# Patient Record
Sex: FEMALE | Race: WHITE | NOT HISPANIC OR LATINO | Employment: FULL TIME | URBAN - METROPOLITAN AREA
[De-identification: names, ages, dates, MRNs, and addresses within clinical notes are randomized per-mention and may not be internally consistent; named-entity substitution may affect disease eponyms.]

---

## 2018-04-05 ENCOUNTER — OFFICE VISIT (OUTPATIENT)
Dept: FAMILY MEDICINE | Facility: CLINIC | Age: 53
End: 2018-04-05
Payer: COMMERCIAL

## 2018-04-05 VITALS
DIASTOLIC BLOOD PRESSURE: 78 MMHG | HEIGHT: 66 IN | WEIGHT: 198 LBS | SYSTOLIC BLOOD PRESSURE: 141 MMHG | RESPIRATION RATE: 18 BRPM | HEART RATE: 60 BPM | TEMPERATURE: 97 F | OXYGEN SATURATION: 96 % | BODY MASS INDEX: 31.82 KG/M2

## 2018-04-05 DIAGNOSIS — F32.A DEPRESSION, UNSPECIFIED DEPRESSION TYPE: Primary | ICD-10-CM

## 2018-04-05 DIAGNOSIS — E78.5 HYPERLIPIDEMIA, UNSPECIFIED HYPERLIPIDEMIA TYPE: ICD-10-CM

## 2018-04-05 DIAGNOSIS — G44.209 ACUTE NON INTRACTABLE TENSION-TYPE HEADACHE: ICD-10-CM

## 2018-04-05 DIAGNOSIS — Z12.11 SCREENING FOR COLON CANCER: ICD-10-CM

## 2018-04-05 DIAGNOSIS — I10 HYPERTENSION, UNSPECIFIED TYPE: ICD-10-CM

## 2018-04-05 DIAGNOSIS — T75.3XXA MOTION SICKNESS, INITIAL ENCOUNTER: ICD-10-CM

## 2018-04-05 DIAGNOSIS — G25.81 RLS (RESTLESS LEGS SYNDROME): ICD-10-CM

## 2018-04-05 PROCEDURE — 99214 OFFICE O/P EST MOD 30 MIN: CPT | Mod: S$GLB,,, | Performed by: FAMILY MEDICINE

## 2018-04-05 RX ORDER — MIRTAZAPINE 15 MG/1
15 TABLET, FILM COATED ORAL DAILY
Refills: 0 | COMMUNITY
Start: 2018-02-14

## 2018-04-05 RX ORDER — METHYLPHENIDATE HYDROCHLORIDE 10 MG/1
10 TABLET ORAL DAILY
COMMUNITY
End: 2018-10-08

## 2018-04-05 RX ORDER — FLUOXETINE HYDROCHLORIDE 20 MG/1
20 CAPSULE ORAL DAILY
COMMUNITY
End: 2018-10-08

## 2018-04-05 RX ORDER — FLUTICASONE PROPIONATE 50 MCG
50 SPRAY, SUSPENSION (ML) NASAL DAILY PRN
COMMUNITY
End: 2018-04-05

## 2018-04-05 RX ORDER — ROSUVASTATIN CALCIUM 20 MG/1
20 TABLET, COATED ORAL DAILY
Qty: 90 TABLET | Refills: 3 | Status: ON HOLD | OUTPATIENT
Start: 2018-04-05 | End: 2018-06-29 | Stop reason: CLARIF

## 2018-04-05 RX ORDER — SCOLOPAMINE TRANSDERMAL SYSTEM 1 MG/1
1 PATCH, EXTENDED RELEASE TRANSDERMAL
Qty: 2 PATCH | Refills: 0 | Status: ON HOLD | OUTPATIENT
Start: 2018-04-05 | End: 2018-06-29 | Stop reason: CLARIF

## 2018-04-05 NOTE — PROGRESS NOTES
Chief Complaint  Chief Complaint   Patient presents with    Follow-up     lab work       HPI  Kirsten Saunders is a 52 y.o. female with multiple medical diagnoses as listed in the medical history and problem list that presents for lab f/u    Hyperlipidemia:  Kirsten has been taking lovastatin for her cholesterol and her LDL has improved from 211 to 187.  However, it makes her feel anxious and bad all over.  In addition her alk phos is elevated.    Hypertension:  BP remains slightly elevated.  We discussed adding a new medication but she wants to try some lifestyle changes first.  We discussed walking daily, weight loss and avoiding salt.    Headaches:  Resolved since school is out.      Hypothyroidism:  Tolerates her medication well.  No history of radiation to her head or neck.  Usually thyroid is very stable.  Labs are WNL today.        PAST MEDICAL HISTORY:  No past medical history on file.    PAST SURGICAL HISTORY:  No past surgical history on file.    SOCIAL HISTORY:  Social History     Social History    Marital status:      Spouse name: N/A    Number of children: N/A    Years of education: N/A     Occupational History    Not on file.     Social History Main Topics    Smoking status: Never Smoker    Smokeless tobacco: Never Used    Alcohol use Not on file    Drug use: Unknown    Sexual activity: Not on file     Other Topics Concern    Not on file     Social History Narrative    No narrative on file       FAMILY HISTORY:  No family history on file.    ALLERGIES AND MEDICATIONS: updated and reviewed.  Review of patient's allergies indicates:   Allergen Reactions    Penicillins      Current Outpatient Prescriptions   Medication Sig Dispense Refill    FLUoxetine (PROZAC) 20 MG capsule Take 20 mg by mouth once daily.      methylphenidate HCl (RITALIN) 10 MG tablet Take 10 mg by mouth once daily.      mirtazapine (REMERON) 15 MG tablet Take 15 mg by mouth once daily.  0    rosuvastatin (CRESTOR)  "20 MG tablet Take 1 tablet (20 mg total) by mouth once daily. 90 tablet 3    scopolamine (TRANSDERM-SCOP) 1.3-1.5 mg (1 mg over 3 days) Place 1 patch onto the skin every 72 hours. 2 patch 0     No current facility-administered medications for this visit.          ROS  Review of Systems   Constitutional: Negative for activity change, appetite change, chills and fatigue.   HENT: Negative for congestion, ear discharge, ear pain, rhinorrhea, sinus pain, sore throat and trouble swallowing.    Eyes: Negative for photophobia, pain, redness, itching and visual disturbance.   Respiratory: Negative for cough, chest tightness, shortness of breath and wheezing.    Cardiovascular: Negative for chest pain, palpitations and leg swelling.   Gastrointestinal: Negative for abdominal distention, abdominal pain, blood in stool, diarrhea, nausea and vomiting.   Genitourinary: Negative for dysuria, pelvic pain, vaginal bleeding, vaginal discharge and vaginal pain.   Musculoskeletal: Negative for arthralgias, back pain, gait problem and neck pain.   Skin: Negative for color change, pallor and rash.   Neurological: Negative for dizziness, tremors, weakness, light-headedness, numbness and headaches.   Psychiatric/Behavioral: Negative for agitation, behavioral problems, confusion and sleep disturbance.           PHYSICAL EXAM  Vitals:    04/05/18 1140   BP: (!) 141/78   BP Location: Left arm   Patient Position: Sitting   Pulse: 60   Resp: 18   Temp: 97.3 °F (36.3 °C)   TempSrc: Tympanic   SpO2: 96%   Weight: 89.8 kg (198 lb)   Height: 5' 6" (1.676 m)    Body mass index is 31.96 kg/m².  Weight: 89.8 kg (198 lb)   Height: 5' 6" (167.6 cm)     Physical Exam   Constitutional: She appears well-developed and well-nourished.   HENT:   Head: Normocephalic.   Right Ear: External ear normal.   Left Ear: External ear normal.   Mouth/Throat: Oropharynx is clear and moist.   Eyes: Conjunctivae and EOM are normal. Pupils are equal, round, and reactive to " light.   Neck: Normal range of motion. Neck supple.   Cardiovascular: Normal rate, regular rhythm, normal heart sounds and intact distal pulses.    Pulmonary/Chest: Effort normal and breath sounds normal.   Neurological: She is alert.   Skin: Skin is warm and dry.   Psychiatric: Her behavior is normal. Judgment normal.         Health Maintenance    Patient has no pending health maintenance at this time           Assessment & Plan      Kirsten was seen today for follow-up.    Diagnoses and all orders for this visit:    Depression, unspecified depression type    Hyperlipidemia, unspecified hyperlipidemia type  -     rosuvastatin (CRESTOR) 20 MG tablet; Take 1 tablet (20 mg total) by mouth once daily.  -     Comprehensive metabolic panel; Future  -     Lipid panel; Future    Hypertension, unspecified type    RLS (restless legs syndrome)    Acute non intractable tension-type headache    Motion sickness, initial encounter  -     scopolamine (TRANSDERM-SCOP) 1.3-1.5 mg (1 mg over 3 days); Place 1 patch onto the skin every 72 hours.    Screening for colon cancer  -     Ambulatory referral to General Surgery          Follow-up: Follow-up in about 3 months (around 7/5/2018).

## 2018-04-20 ENCOUNTER — TELEPHONE (OUTPATIENT)
Dept: SURGERY | Facility: CLINIC | Age: 53
End: 2018-04-20

## 2018-04-20 NOTE — TELEPHONE ENCOUNTER
----- Message from Mirian Dickey sent at 4/20/2018  2:14 PM CDT -----  Type:  Patient Returning Call    Who Called:  Patient   Who Left Message for Patient:  Yung Brooks  Does the patient know what this is regarding?:  Colonoscopy scheduleing  Best Call Back Number:  [phone]  Additional Information:

## 2018-05-31 ENCOUNTER — OFFICE VISIT (OUTPATIENT)
Dept: SURGERY | Facility: CLINIC | Age: 53
End: 2018-05-31
Payer: COMMERCIAL

## 2018-05-31 VITALS
SYSTOLIC BLOOD PRESSURE: 157 MMHG | WEIGHT: 208 LBS | BODY MASS INDEX: 33.43 KG/M2 | OXYGEN SATURATION: 93 % | HEART RATE: 98 BPM | HEIGHT: 66 IN | TEMPERATURE: 98 F | RESPIRATION RATE: 18 BRPM | DIASTOLIC BLOOD PRESSURE: 85 MMHG

## 2018-05-31 DIAGNOSIS — Z12.11 ENCOUNTER FOR SCREENING COLONOSCOPY: Primary | ICD-10-CM

## 2018-05-31 PROCEDURE — 99213 OFFICE O/P EST LOW 20 MIN: CPT | Mod: S$GLB,,, | Performed by: SURGERY

## 2018-05-31 PROCEDURE — 3079F DIAST BP 80-89 MM HG: CPT | Mod: S$GLB,,, | Performed by: SURGERY

## 2018-05-31 PROCEDURE — 3008F BODY MASS INDEX DOCD: CPT | Mod: S$GLB,,, | Performed by: SURGERY

## 2018-05-31 PROCEDURE — 3077F SYST BP >= 140 MM HG: CPT | Mod: S$GLB,,, | Performed by: SURGERY

## 2018-05-31 RX ORDER — SODIUM, POTASSIUM,MAG SULFATES 17.5-3.13G
SOLUTION, RECONSTITUTED, ORAL ORAL
Qty: 2 BOTTLE | Refills: 0 | Status: ON HOLD | OUTPATIENT
Start: 2018-05-31 | End: 2018-06-29

## 2018-05-31 RX ORDER — SODIUM CHLORIDE, SODIUM LACTATE, POTASSIUM CHLORIDE, CALCIUM CHLORIDE 600; 310; 30; 20 MG/100ML; MG/100ML; MG/100ML; MG/100ML
INJECTION, SOLUTION INTRAVENOUS CONTINUOUS
Status: CANCELLED | OUTPATIENT
Start: 2018-05-31

## 2018-05-31 RX ORDER — LIDOCAINE HYDROCHLORIDE 10 MG/ML
1 INJECTION, SOLUTION EPIDURAL; INFILTRATION; INTRACAUDAL; PERINEURAL ONCE
Status: CANCELLED | OUTPATIENT
Start: 2018-05-31 | End: 2018-05-31

## 2018-05-31 NOTE — PROGRESS NOTES
Subjective:       Patient ID: Kirsten Saunders is a 53 y.o. female.    Chief Complaint: Consult (Referred by Dr. Willson for screening colonoscopy)      HPI:  HPI    Allergies & Meds:  Review of patient's allergies indicates:   Allergen Reactions    Penicillins        Current Outpatient Prescriptions   Medication Sig Dispense Refill    FLUoxetine (PROZAC) 20 MG capsule Take 20 mg by mouth once daily.      methylphenidate HCl (RITALIN) 10 MG tablet Take 10 mg by mouth once daily.      mirtazapine (REMERON) 15 MG tablet Take 15 mg by mouth once daily.  0    scopolamine (TRANSDERM-SCOP) 1.3-1.5 mg (1 mg over 3 days) Place 1 patch onto the skin every 72 hours. 2 patch 0    rosuvastatin (CRESTOR) 20 MG tablet Take 1 tablet (20 mg total) by mouth once daily. 90 tablet 3    sodium,potassium,mag sulfates (SUPREP BOWEL PREP KIT) 17.5-3.13-1.6 gram SolR Follow written instructions provided by Clinic 2 Bottle 0     No current facility-administered medications for this visit.        PMFSHx:  Past medical history, surgical history, family history, social history reviewed and no changes noted.        Review of Systems    Objective:      Physical Exam      Test Results:  Assessment:       1. Encounter for screening colonoscopy        Plan:   Encounter for screening colonoscopy  -     Case Request Operating Room: COLONOSCOPY  -     Place in Outpatient; Standing  -     Vital signs; Standing  -     Insert peripheral IV; Standing  -     Verify beta-blocker dose taken within 24 hours if patient is prescribed beta-blocker; Standing  -     Height and weight; Standing  -     Verify discontinuation of antithrombotics; Standing  -     Verify blood consent; Standing  -     Verify consent; Standing  -     Diet NPO; Standing  -     Early ambulation; Standing  -     Comprehensive metabolic panel; Future; Expected date: 05/31/2018  -     CBC auto differential; Future; Expected date: 05/31/2018  -     Pulse Oximetry Q15 Min; Standing  -      EKG 12-lead; Future  -     Lipid panel; Future; Expected date: 05/31/2018    Other orders  -     sodium,potassium,mag sulfates (SUPREP BOWEL PREP KIT) 17.5-3.13-1.6 gram SolR; Follow written instructions provided by Clinic  Dispense: 2 Bottle; Refill: 0  -     lidocaine (PF) 10 mg/ml (1%) injection 10 mg; Inject 1 mL (10 mg total) into the skin once.  -     lactated ringers infusion; Inject into the vein continuous.  -     IP VTE LOW RISK PATIENT; Standing        No Follow-up on file.    Counseling/Medical Decision Making:

## 2018-05-31 NOTE — PROGRESS NOTES
Subjective:       Patient ID: Kirsten Saunders is a 53 y.o. female.    Chief Complaint: Consult (Referred by Dr. Willson for screening colonoscopy)      HPI:  HPI    Allergies & Meds:  Review of patient's allergies indicates:   Allergen Reactions    Penicillins        Current Outpatient Prescriptions   Medication Sig Dispense Refill    FLUoxetine (PROZAC) 20 MG capsule Take 20 mg by mouth once daily.      methylphenidate HCl (RITALIN) 10 MG tablet Take 10 mg by mouth once daily.      mirtazapine (REMERON) 15 MG tablet Take 15 mg by mouth once daily.  0    scopolamine (TRANSDERM-SCOP) 1.3-1.5 mg (1 mg over 3 days) Place 1 patch onto the skin every 72 hours. 2 patch 0    rosuvastatin (CRESTOR) 20 MG tablet Take 1 tablet (20 mg total) by mouth once daily. 90 tablet 3    sodium,potassium,mag sulfates (SUPREP BOWEL PREP KIT) 17.5-3.13-1.6 gram SolR Follow written instructions provided by Clinic 2 Bottle 0     No current facility-administered medications for this visit.        PMFSHx:  Past Medical History:   Diagnosis Date    Allergy     Anxiety     Depression     Hyperlipidemia     Hypertension        Past Surgical History:   Procedure Laterality Date    surgical history denied         Family History   Problem Relation Age of Onset    Heart disease Sister     Lung cancer Sister     Heart disease Brother        Social History   Substance Use Topics    Smoking status: Never Smoker    Smokeless tobacco: Never Used    Alcohol use Yes      Comment: very rarely       Review of Systems    Objective:      Physical Exam      Test Results:    Assessment:       1. Encounter for screening colonoscopy        Plan:   Encounter for screening colonoscopy  -     Case Request Operating Room: COLONOSCOPY  -     Place in Outpatient; Standing  -     Vital signs; Standing  -     Insert peripheral IV; Standing  -     Verify beta-blocker dose taken within 24 hours if patient is prescribed beta-blocker; Standing  -      Height and weight; Standing  -     Verify discontinuation of antithrombotics; Standing  -     Verify blood consent; Standing  -     Verify consent; Standing  -     Diet NPO; Standing  -     Early ambulation; Standing  -     Comprehensive metabolic panel; Future; Expected date: 05/31/2018  -     CBC auto differential; Future; Expected date: 05/31/2018  -     Pulse Oximetry Q15 Min; Standing  -     EKG 12-lead; Future  -     Lipid panel; Future; Expected date: 05/31/2018    Other orders  -     sodium,potassium,mag sulfates (SUPREP BOWEL PREP KIT) 17.5-3.13-1.6 gram SolR; Follow written instructions provided by Clinic  Dispense: 2 Bottle; Refill: 0  -     lidocaine (PF) 10 mg/ml (1%) injection 10 mg; Inject 1 mL (10 mg total) into the skin once.  -     lactated ringers infusion; Inject into the vein continuous.  -     IP VTE LOW RISK PATIENT; Standing        No Follow-up on file.    Counseling/Medical Decision Making:

## 2018-05-31 NOTE — PROGRESS NOTES
Subjective:       Patient ID: Kirsten Saunders is a 53 y.o. female.    Chief Complaint: Consult (Referred by Dr. Willson for screening colonoscopy)      HPI:  Ms. Saunders is seen today in consultation from Dr. Willson for colon cancer screening. She has no complaints. No abdominal pain, nausea, vomiting, diarrhea, constipation, melena, hematochezia, change in bowel habits, change in stool characteristics, weight loss, decrease in caliber of stool, etc. No family history of colon cancer. No aggravating or alleviating factors. No other associated symptoms. No prior colonoscopy.         Allergies & Meds:  Review of patient's allergies indicates:   Allergen Reactions    Penicillins        Current Outpatient Prescriptions   Medication Sig Dispense Refill    FLUoxetine (PROZAC) 20 MG capsule Take 20 mg by mouth once daily.      methylphenidate HCl (RITALIN) 10 MG tablet Take 10 mg by mouth once daily.      mirtazapine (REMERON) 15 MG tablet Take 15 mg by mouth once daily.  0    scopolamine (TRANSDERM-SCOP) 1.3-1.5 mg (1 mg over 3 days) Place 1 patch onto the skin every 72 hours. 2 patch 0    rosuvastatin (CRESTOR) 20 MG tablet Take 1 tablet (20 mg total) by mouth once daily. 90 tablet 3    sodium,potassium,mag sulfates (SUPREP BOWEL PREP KIT) 17.5-3.13-1.6 gram SolR Follow written instructions provided by Clinic 2 Bottle 0     No current facility-administered medications for this visit.        PMFSHx:  Past Medical History:   Diagnosis Date    Allergy     Anxiety     Depression     Hyperlipidemia     Hypertension        Past Surgical History:   Procedure Laterality Date    surgical history denied         Family History   Problem Relation Age of Onset    Heart disease Sister     Lung cancer Sister     Heart disease Brother        Social History   Substance Use Topics    Smoking status: Never Smoker    Smokeless tobacco: Never Used    Alcohol use Yes      Comment: very rarely       Review of Systems    Constitutional: Negative for appetite change, chills, fatigue, fever and unexpected weight change.   HENT: Negative for congestion, dental problem, ear pain, mouth sores, postnasal drip, rhinorrhea, sore throat, tinnitus, trouble swallowing and voice change.    Eyes: Negative for photophobia, pain, discharge and visual disturbance.   Respiratory: Negative for cough, chest tightness, shortness of breath and wheezing.    Cardiovascular: Negative for chest pain, palpitations and leg swelling.   Gastrointestinal: Negative for abdominal pain, blood in stool, constipation, diarrhea, nausea and vomiting.   Endocrine: Negative for cold intolerance, heat intolerance, polydipsia, polyphagia and polyuria.   Genitourinary: Negative for difficulty urinating, dysuria, flank pain, frequency, hematuria and urgency.   Musculoskeletal: Negative for arthralgias, joint swelling and myalgias.   Skin: Negative for color change and rash.   Allergic/Immunologic: Negative for immunocompromised state.   Neurological: Negative for dizziness, tremors, seizures, syncope, speech difficulty, weakness, numbness and headaches.   Hematological: Negative for adenopathy. Does not bruise/bleed easily.   Psychiatric/Behavioral: Negative for agitation, confusion, hallucinations, self-injury and suicidal ideas. The patient is not nervous/anxious.        Objective:      Physical Exam   Constitutional: She is oriented to person, place, and time. She appears well-developed and well-nourished. She is active.  Non-toxic appearance. No distress.   HENT:   Head: Normocephalic and atraumatic.   Right Ear: Hearing and external ear normal. No drainage or tenderness.   Left Ear: Hearing and external ear normal. No drainage or tenderness.   Nose: Nose normal. No rhinorrhea. No epistaxis.   Mouth/Throat: Uvula is midline, oropharynx is clear and moist and mucous membranes are normal. Mucous membranes are not pale, not dry and not cyanotic. No oropharyngeal exudate.    Eyes: Conjunctivae and lids are normal. Pupils are equal, round, and reactive to light. Right eye exhibits no discharge and no exudate. Left eye exhibits no discharge and no exudate. Right conjunctiva is not injected. Right eye exhibits no nystagmus. Left eye exhibits no nystagmus.   Neck: Trachea normal, full passive range of motion without pain and phonation normal. No JVD present. Carotid bruit is not present. No tracheal deviation present. No thyroid mass and no thyromegaly present.   Cardiovascular: Normal rate, regular rhythm, S1 normal, S2 normal, normal heart sounds and intact distal pulses.  PMI is not displaced.  Exam reveals no gallop and no friction rub.    No murmur heard.  Pulmonary/Chest: Effort normal and breath sounds normal. No accessory muscle usage. No respiratory distress. She exhibits no mass, no tenderness and no crepitus. Right breast exhibits no inverted nipple, no mass, no nipple discharge, no skin change and no tenderness. Left breast exhibits no inverted nipple, no mass, no nipple discharge, no skin change and no tenderness. Breasts are symmetrical.   Abdominal: Soft. Normal appearance and bowel sounds are normal. She exhibits no distension, no fluid wave, no abdominal bruit and no mass. There is no hepatosplenomegaly. There is no tenderness. There is no rebound, no guarding and negative Eaton's sign. No hernia. Hernia confirmed negative in the right inguinal area and confirmed negative in the left inguinal area.   Genitourinary: Rectum normal and vagina normal. There is no rash or lesion on the right labia. There is no rash or lesion on the left labia. Right adnexum displays no mass. Left adnexum displays no mass. No vaginal discharge found.   Musculoskeletal:        Cervical back: Normal.        Thoracic back: Normal.        Lumbar back: Normal.        Right upper arm: Normal.        Left upper arm: Normal.        Right forearm: Normal.        Left forearm: Normal.        Right hand:  Normal.        Left hand: Normal.        Right upper leg: Normal.        Left upper leg: Normal.        Right lower leg: Normal.        Left lower leg: Normal.        Right foot: Normal.        Left foot: Normal.   Lymphadenopathy:        Head (right side): No submental, no submandibular and no posterior auricular adenopathy present.        Head (left side): No submental, no submandibular and no posterior auricular adenopathy present.     She has no cervical adenopathy.     She has no axillary adenopathy.        Right: No inguinal and no supraclavicular adenopathy present.        Left: No inguinal and no supraclavicular adenopathy present.   Neurological: She is alert and oriented to person, place, and time. She has normal strength. No cranial nerve deficit or sensory deficit. Coordination and gait normal. GCS eye subscore is 4. GCS verbal subscore is 5. GCS motor subscore is 6.   Skin: Skin is warm, dry and intact. No rash noted. No cyanosis. Nails show no clubbing.   Psychiatric: She has a normal mood and affect. Her speech is normal. Judgment and thought content normal. Her mood appears not anxious. Her affect is not inappropriate. She is not actively hallucinating. She does not exhibit a depressed mood.         Test Results:  Pending    Assessment:     Due for screening colonoscopy.  No contraindication evident for procedure or sedation.  Additional preprocedure evaluation with labs and ECG warranted.  Differential diagnosis includes but not limited to colorectal cancer, diverticulosis, hemorrhoids, angiodysplasias, inflammatory bowel disease, etc.  Options include endoscopy, radiologic studies, second opinion, empiric management, observation, capsule endoscopy, etc.     1. Encounter for screening colonoscopy        Plan:   Encounter for screening colonoscopy  -     Case Request Operating Room: COLONOSCOPY  -     Place in Outpatient; Standing  -     Vital signs; Standing  -     Insert peripheral IV; Standing  -      Verify beta-blocker dose taken within 24 hours if patient is prescribed beta-blocker; Standing  -     Height and weight; Standing  -     Verify discontinuation of antithrombotics; Standing  -     Verify blood consent; Standing  -     Verify consent; Standing  -     Diet NPO; Standing  -     Early ambulation; Standing  -     Comprehensive metabolic panel; Future; Expected date: 05/31/2018  -     CBC auto differential; Future; Expected date: 05/31/2018  -     Pulse Oximetry Q15 Min; Standing  -     EKG 12-lead; Future  -     Lipid panel; Future; Expected date: 05/31/2018    Other orders  -     sodium,potassium,mag sulfates (SUPREP BOWEL PREP KIT) 17.5-3.13-1.6 gram SolR; Follow written instructions provided by Clinic  Dispense: 2 Bottle; Refill: 0  -     lidocaine (PF) 10 mg/ml (1%) injection 10 mg; Inject 1 mL (10 mg total) into the skin once.  -     lactated ringers infusion; Inject into the vein continuous.  -     IP VTE LOW RISK PATIENT; Standing        Follow-up in about 6 weeks (around 7/12/2018) for routine post-endosocpy visit.    Counseling/Medical Decision Making:    Kirsten [place] was counseled regarding the results of the evaluation thus far and the differential diagnosis.  Diagnostic and therapeutic options were discussed in detail along with the risks, benefits, possible complications, details of, and indications for each option.  Diagnoses discussed included but were not limited to: hemorrhoids, diverticulosis, cancer, IBD, IBS, benign tumors, angiodysplasias.  Options discussed included but were not limited to: colonoscopy, proctoscopy, anoscopy, sigmoidoscopy, radiologic studies, PillCam, empiric therapy, second opinion, etc. Possible complications of colonoscopy discussed included but were not limited to: bleeding, infections, endocarditis, injury to internal organs, incomplete exam, failure to diagnose cancer or other serious condition, need for emergency surgery, need for a temporary colostomy,  need for additional operations or procedures, etc.  Entire conversation was held in layman's terms.  Questions solicited and answered.  I read the consent form to her verbatim.  All unfamiliar terms were defined.  Krames booklets were provided on colonoscopy, polyps, diverticulosis, hemorrhoids, cancer, etc.  A copy of the consent form was provided as well for her review outside the office / hospital prior to the procedure.  At the conclusion of the conversation she voiced complete understanding of all we discussed and satisfaction that all of her questions had been answered to her full understanding.  She stated that she felt fully informed and totally capable of making an informed decision.  She desires and requests to proceed with colonoscopy.    Total face-to-face encounter time was 30 minutes with 15 minutes spent counseling the patient as outlined/summarized above.

## 2018-05-31 NOTE — H&P
[facility] - General Surgery  General Surgery  H&P      Patient Name: Kirsten Saunders  MRN: [id number]     Chief Complaint: Consult (Referred by Dr. Willson for screening colonoscopy)      HPI:  Ms. Saunders is seen today in consultation from Dr. Willson for colon cancer screening. She has no complaints. No abdominal pain, nausea, vomiting, diarrhea, constipation, melena, hematochezia, change in bowel habits, change in stool characteristics, weight loss, decrease in caliber of stool, etc. No family history of colon cancer. No aggravating or alleviating factors. No other associated symptoms. No prior colonoscopy.         Allergies & Meds:  Review of patient's allergies indicates:   Allergen Reactions    Penicillins        Current Outpatient Prescriptions   Medication Sig Dispense Refill    FLUoxetine (PROZAC) 20 MG capsule Take 20 mg by mouth once daily.      methylphenidate HCl (RITALIN) 10 MG tablet Take 10 mg by mouth once daily.      mirtazapine (REMERON) 15 MG tablet Take 15 mg by mouth once daily.  0    scopolamine (TRANSDERM-SCOP) 1.3-1.5 mg (1 mg over 3 days) Place 1 patch onto the skin every 72 hours. 2 patch 0    rosuvastatin (CRESTOR) 20 MG tablet Take 1 tablet (20 mg total) by mouth once daily. 90 tablet 3    sodium,potassium,mag sulfates (SUPREP BOWEL PREP KIT) 17.5-3.13-1.6 gram SolR Follow written instructions provided by Clinic 2 Bottle 0     No current facility-administered medications for this visit.        PMFSHx:  Past Medical History:   Diagnosis Date    Allergy     Anxiety     Depression     Hyperlipidemia     Hypertension        Past Surgical History:   Procedure Laterality Date    surgical history denied         Family History   Problem Relation Age of Onset    Heart disease Sister     Lung cancer Sister     Heart disease Brother        Social History   Substance Use Topics    Smoking status: Never Smoker    Smokeless tobacco: Never Used    Alcohol use  Yes      Comment: very rarely       Review of Systems   Constitutional: Negative for appetite change, chills, fatigue, fever and unexpected weight change.   HENT: Negative for congestion, dental problem, ear pain, mouth sores, postnasal drip, rhinorrhea, sore throat, tinnitus, trouble swallowing and voice change.    Eyes: Negative for photophobia, pain, discharge and visual disturbance.   Respiratory: Negative for cough, chest tightness, shortness of breath and wheezing.    Cardiovascular: Negative for chest pain, palpitations and leg swelling.   Gastrointestinal: Negative for abdominal pain, blood in stool, constipation, diarrhea, nausea and vomiting.   Endocrine: Negative for cold intolerance, heat intolerance, polydipsia, polyphagia and polyuria.   Genitourinary: Negative for difficulty urinating, dysuria, flank pain, frequency, hematuria and urgency.   Musculoskeletal: Negative for arthralgias, joint swelling and myalgias.   Skin: Negative for color change and rash.   Allergic/Immunologic: Negative for immunocompromised state.   Neurological: Negative for dizziness, tremors, seizures, syncope, speech difficulty, weakness, numbness and headaches.   Hematological: Negative for adenopathy. Does not bruise/bleed easily.   Psychiatric/Behavioral: Negative for agitation, confusion, hallucinations, self-injury and suicidal ideas. The patient is not nervous/anxious.        Objective:      Physical Exam   Constitutional: She is oriented to person, place, and time. She appears well-developed and well-nourished. She is active.  Non-toxic appearance. No distress.   HENT:   Head: Normocephalic and atraumatic.   Right Ear: Hearing and external ear normal. No drainage or tenderness.   Left Ear: Hearing and external ear normal. No drainage or tenderness.   Nose: Nose normal. No rhinorrhea. No epistaxis.   Mouth/Throat: Uvula is midline, oropharynx is clear and moist and mucous membranes are normal. Mucous membranes are not  pale, not dry and not cyanotic. No oropharyngeal exudate.   Eyes: Conjunctivae and lids are normal. Pupils are equal, round, and reactive to light. Right eye exhibits no discharge and no exudate. Left eye exhibits no discharge and no exudate. Right conjunctiva is not injected. Right eye exhibits no nystagmus. Left eye exhibits no nystagmus.   Neck: Trachea normal, full passive range of motion without pain and phonation normal. No JVD present. Carotid bruit is not present. No tracheal deviation present. No thyroid mass and no thyromegaly present.   Cardiovascular: Normal rate, regular rhythm, S1 normal, S2 normal, normal heart sounds and intact distal pulses.  PMI is not displaced.  Exam reveals no gallop and no friction rub.    No murmur heard.  Pulmonary/Chest: Effort normal and breath sounds normal. No accessory muscle usage. No respiratory distress. She exhibits no mass, no tenderness and no crepitus. Right breast exhibits no inverted nipple, no mass, no nipple discharge, no skin change and no tenderness. Left breast exhibits no inverted nipple, no mass, no nipple discharge, no skin change and no tenderness. Breasts are symmetrical.   Abdominal: Soft. Normal appearance and bowel sounds are normal. She exhibits no distension, no fluid wave, no abdominal bruit and no mass. There is no hepatosplenomegaly. There is no tenderness. There is no rebound, no guarding and negative Eaton's sign. No hernia. Hernia confirmed negative in the right inguinal area and confirmed negative in the left inguinal area.   Genitourinary: Rectum normal and vagina normal. There is no rash or lesion on the right labia. There is no rash or lesion on the left labia. Right adnexum displays no mass. Left adnexum displays no mass. No vaginal discharge found.   Musculoskeletal:        Cervical back: Normal.        Thoracic back: Normal.        Lumbar back: Normal.        Right upper arm: Normal.        Left upper arm: Normal.        Right  forearm: Normal.        Left forearm: Normal.        Right hand: Normal.        Left hand: Normal.        Right upper leg: Normal.        Left upper leg: Normal.        Right lower leg: Normal.        Left lower leg: Normal.        Right foot: Normal.        Left foot: Normal.   Lymphadenopathy:        Head (right side): No submental, no submandibular and no posterior auricular adenopathy present.        Head (left side): No submental, no submandibular and no posterior auricular adenopathy present.     She has no cervical adenopathy.     She has no axillary adenopathy.        Right: No inguinal and no supraclavicular adenopathy present.        Left: No inguinal and no supraclavicular adenopathy present.   Neurological: She is alert and oriented to person, place, and time. She has normal strength. No cranial nerve deficit or sensory deficit. Coordination and gait normal. GCS eye subscore is 4. GCS verbal subscore is 5. GCS motor subscore is 6.   Skin: Skin is warm, dry and intact. No rash noted. No cyanosis. Nails show no clubbing.   Psychiatric: She has a normal mood and affect. Her speech is normal. Judgment and thought content normal. Her mood appears not anxious. Her affect is not inappropriate. She is not actively hallucinating. She does not exhibit a depressed mood.         Test Results:  Pending    Assessment:     Due for screening colonoscopy.  No contraindication evident for procedure or sedation.  Additional preprocedure evaluation with labs and ECG warranted.  Differential diagnosis includes but not limited to colorectal cancer, diverticulosis, hemorrhoids, angiodysplasias, inflammatory bowel disease, etc.  Options include endoscopy, radiologic studies, second opinion, empiric management, observation, capsule endoscopy, etc.     1. Encounter for screening colonoscopy        Plan:   Encounter for screening colonoscopy  -     Case Request Operating Room: COLONOSCOPY  -     Place in Outpatient; Standing  -      Vital signs; Standing  -     Insert peripheral IV; Standing  -     Verify beta-blocker dose taken within 24 hours if patient is prescribed beta-blocker; Standing  -     Height and weight; Standing  -     Verify discontinuation of antithrombotics; Standing  -     Verify blood consent; Standing  -     Verify consent; Standing  -     Diet NPO; Standing  -     Early ambulation; Standing  -     Comprehensive metabolic panel; Future; Expected date: 05/31/2018  -     CBC auto differential; Future; Expected date: 05/31/2018  -     Pulse Oximetry Q15 Min; Standing  -     EKG 12-lead; Future  -     Lipid panel; Future; Expected date: 05/31/2018    Other orders  -     sodium,potassium,mag sulfates (SUPREP BOWEL PREP KIT) 17.5-3.13-1.6 gram SolR; Follow written instructions provided by Clinic  Dispense: 2 Bottle; Refill: 0  -     lidocaine (PF) 10 mg/ml (1%) injection 10 mg; Inject 1 mL (10 mg total) into the skin once.  -     lactated ringers infusion; Inject into the vein continuous.  -     IP VTE LOW RISK PATIENT; Standing        Follow-up in about 6 weeks (around 7/12/2018) for routine post-endosocpy visit.    Counseling/Medical Decision Making:    Kirsten Saunders was counseled regarding the results of the evaluation thus far and the differential diagnosis.  Diagnostic and therapeutic options were discussed in detail along with the risks, benefits, possible complications, details of, and indications for each option.  Diagnoses discussed included but were not limited to: hemorrhoids, diverticulosis, cancer, IBD, IBS, benign tumors, angiodysplasias.  Options discussed included but were not limited to: colonoscopy, proctoscopy, anoscopy, sigmoidoscopy, radiologic studies, PillCam, empiric therapy, second opinion, etc. Possible complications of colonoscopy discussed included but were not limited to: bleeding, infections, endocarditis, injury to internal organs, incomplete exam, failure to diagnose cancer or other serious  condition, need for emergency surgery, need for a temporary colostomy, need for additional operations or procedures, etc.  Entire conversation was held in layman's terms.  Questions solicited and answered.  I read the consent form to her verbatim.  All unfamiliar terms were defined.  Krames booklets were provided on colonoscopy, polyps, diverticulosis, hemorrhoids, cancer, etc.  A copy of the consent form was provided as well for her review outside the office / hospital prior to the procedure.  At the conclusion of the conversation she voiced complete understanding of all we discussed and satisfaction that all of her questions had been answered to her full understanding.  She stated that she felt fully informed and totally capable of making an informed decision.  She desires and requests to proceed with colonoscopy.

## 2018-06-25 ENCOUNTER — HOSPITAL ENCOUNTER (OUTPATIENT)
Dept: CARDIOLOGY | Facility: HOSPITAL | Age: 53
Discharge: HOME | End: 2018-06-25
Attending: SURGERY
Payer: COMMERCIAL

## 2018-06-25 DIAGNOSIS — Z12.11 ENCOUNTER FOR SCREENING COLONOSCOPY: ICD-10-CM

## 2018-06-25 PROCEDURE — 93005 ELECTROCARDIOGRAM TRACING: CPT

## 2018-06-29 ENCOUNTER — ANESTHESIA EVENT (OUTPATIENT)
Dept: SURGERY | Facility: HOSPITAL | Age: 53
End: 2018-06-29
Payer: COMMERCIAL

## 2018-06-29 ENCOUNTER — ANESTHESIA (OUTPATIENT)
Dept: SURGERY | Facility: HOSPITAL | Age: 53
End: 2018-06-29
Payer: COMMERCIAL

## 2018-06-29 ENCOUNTER — HOSPITAL ENCOUNTER (OUTPATIENT)
Facility: HOSPITAL | Age: 53
Discharge: HOME | End: 2018-06-29
Attending: SURGERY | Admitting: SURGERY
Payer: COMMERCIAL

## 2018-06-29 ENCOUNTER — SURGERY (OUTPATIENT)
Age: 53
End: 2018-06-29

## 2018-06-29 VITALS
SYSTOLIC BLOOD PRESSURE: 164 MMHG | DIASTOLIC BLOOD PRESSURE: 100 MMHG | HEART RATE: 81 BPM | OXYGEN SATURATION: 99 % | HEIGHT: 66 IN | TEMPERATURE: 98 F | BODY MASS INDEX: 31.82 KG/M2 | RESPIRATION RATE: 11 BRPM | WEIGHT: 198 LBS

## 2018-06-29 DIAGNOSIS — Z12.11 ENCOUNTER FOR SCREENING COLONOSCOPY: ICD-10-CM

## 2018-06-29 PROCEDURE — D9220A PRA ANESTHESIA: Mod: ,,, | Performed by: ANESTHESIOLOGY

## 2018-06-29 PROCEDURE — 45378 DIAGNOSTIC COLONOSCOPY: CPT | Performed by: SURGERY

## 2018-06-29 PROCEDURE — 63600175 PHARM REV CODE 636 W HCPCS: Performed by: NURSE ANESTHETIST, CERTIFIED REGISTERED

## 2018-06-29 PROCEDURE — G0121 COLON CA SCRN NOT HI RSK IND: HCPCS | Mod: ,,, | Performed by: SURGERY

## 2018-06-29 PROCEDURE — 37000009 HC ANESTHESIA EA ADD 15 MINS: Performed by: SURGERY

## 2018-06-29 PROCEDURE — 37000008 HC ANESTHESIA 1ST 15 MINUTES: Performed by: SURGERY

## 2018-06-29 PROCEDURE — 25000003 PHARM REV CODE 250: Performed by: SURGERY

## 2018-06-29 PROCEDURE — 25000003 PHARM REV CODE 250: Performed by: NURSE ANESTHETIST, CERTIFIED REGISTERED

## 2018-06-29 RX ORDER — MORPHINE SULFATE 4 MG/ML
2 INJECTION, SOLUTION INTRAMUSCULAR; INTRAVENOUS EVERY 5 MIN PRN
Status: DISCONTINUED | OUTPATIENT
Start: 2018-06-29 | End: 2018-06-29 | Stop reason: HOSPADM

## 2018-06-29 RX ORDER — SODIUM CHLORIDE, SODIUM LACTATE, POTASSIUM CHLORIDE, CALCIUM CHLORIDE 600; 310; 30; 20 MG/100ML; MG/100ML; MG/100ML; MG/100ML
INJECTION, SOLUTION INTRAVENOUS CONTINUOUS
Status: DISCONTINUED | OUTPATIENT
Start: 2018-06-29 | End: 2018-06-29 | Stop reason: HOSPADM

## 2018-06-29 RX ORDER — LIDOCAINE HYDROCHLORIDE 10 MG/ML
1 INJECTION, SOLUTION EPIDURAL; INFILTRATION; INTRACAUDAL; PERINEURAL ONCE
Status: DISCONTINUED | OUTPATIENT
Start: 2018-06-29 | End: 2018-06-29 | Stop reason: HOSPADM

## 2018-06-29 RX ORDER — SODIUM CHLORIDE, SODIUM LACTATE, POTASSIUM CHLORIDE, CALCIUM CHLORIDE 600; 310; 30; 20 MG/100ML; MG/100ML; MG/100ML; MG/100ML
125 INJECTION, SOLUTION INTRAVENOUS CONTINUOUS
Status: DISCONTINUED | OUTPATIENT
Start: 2018-06-29 | End: 2018-06-29 | Stop reason: HOSPADM

## 2018-06-29 RX ORDER — DIPHENHYDRAMINE HYDROCHLORIDE 50 MG/ML
12.5 INJECTION INTRAMUSCULAR; INTRAVENOUS
Status: DISCONTINUED | OUTPATIENT
Start: 2018-06-29 | End: 2018-06-29 | Stop reason: HOSPADM

## 2018-06-29 RX ORDER — MIDAZOLAM HYDROCHLORIDE 1 MG/ML
INJECTION, SOLUTION INTRAMUSCULAR; INTRAVENOUS
Status: DISCONTINUED | OUTPATIENT
Start: 2018-06-29 | End: 2018-06-29

## 2018-06-29 RX ORDER — ONDANSETRON 2 MG/ML
4 INJECTION INTRAMUSCULAR; INTRAVENOUS DAILY PRN
Status: DISCONTINUED | OUTPATIENT
Start: 2018-06-29 | End: 2018-06-29 | Stop reason: HOSPADM

## 2018-06-29 RX ORDER — GLYCOPYRROLATE 0.2 MG/ML
INJECTION INTRAMUSCULAR; INTRAVENOUS
Status: DISCONTINUED | OUTPATIENT
Start: 2018-06-29 | End: 2018-06-29

## 2018-06-29 RX ORDER — PROPOFOL 10 MG/ML
VIAL (ML) INTRAVENOUS
Status: DISCONTINUED | OUTPATIENT
Start: 2018-06-29 | End: 2018-06-29

## 2018-06-29 RX ADMIN — GLYCOPYRROLATE 0.4 MG: 0.2 INJECTION INTRAMUSCULAR; INTRAVENOUS at 10:06

## 2018-06-29 RX ADMIN — PROPOFOL 50 MG: 10 INJECTION, EMULSION INTRAVENOUS at 10:06

## 2018-06-29 RX ADMIN — PROPOFOL 30 MG: 10 INJECTION, EMULSION INTRAVENOUS at 10:06

## 2018-06-29 RX ADMIN — PROPOFOL 60 MG: 10 INJECTION, EMULSION INTRAVENOUS at 09:06

## 2018-06-29 RX ADMIN — PROPOFOL 50 MG: 10 INJECTION, EMULSION INTRAVENOUS at 09:06

## 2018-06-29 RX ADMIN — SODIUM CHLORIDE, POTASSIUM CHLORIDE, SODIUM LACTATE AND CALCIUM CHLORIDE: 600; 310; 30; 20 INJECTION, SOLUTION INTRAVENOUS at 09:06

## 2018-06-29 RX ADMIN — PROPOFOL 40 MG: 10 INJECTION, EMULSION INTRAVENOUS at 09:06

## 2018-06-29 RX ADMIN — MIDAZOLAM HYDROCHLORIDE 2 MG: 1 INJECTION, SOLUTION INTRAMUSCULAR; INTRAVENOUS at 09:06

## 2018-06-29 NOTE — INTERVAL H&P NOTE
The patient has been examined and the H&P has been reviewed:    I concur with the findings and no changes have occurred since H&P was written.     Lab results reviewed from 6/25/2018.  CBC showed no evidence of leukocytosis, anemia, or thrombocytopenia.  Electrolytes were all in the range of normal. BUN and creatinine showed no evidence of renal dysfunction.  Glucose showed no suspicion diabetes.  Liver profile showed no evidence of hepatocellular disease or biliary obstruction. Lipid profile showed a hypercholesterolemia with an elevated LDL    EKG reviewed from 6/25/2018.  Normal sinus rhythm. No ischemic changes.    Surgery risks, benefits and alternative options discussed and understood by patient/family.          Active Hospital Problems    Diagnosis  POA    Encounter for screening colonoscopy [Z12.11]  Not Applicable      Resolved Hospital Problems    Diagnosis Date Resolved POA   No resolved problems to display.

## 2018-06-29 NOTE — H&P (VIEW-ONLY)
[facility] - General Surgery  General Surgery  H&P      Patient Name: Kirsten Saunders  MRN: [id number]     Chief Complaint: Consult (Referred by Dr. Willson for screening colonoscopy)      HPI:  Ms. Saunders is seen today in consultation from Dr. Willosn for colon cancer screening. She has no complaints. No abdominal pain, nausea, vomiting, diarrhea, constipation, melena, hematochezia, change in bowel habits, change in stool characteristics, weight loss, decrease in caliber of stool, etc. No family history of colon cancer. No aggravating or alleviating factors. No other associated symptoms. No prior colonoscopy.         Allergies & Meds:  Review of patient's allergies indicates:   Allergen Reactions    Penicillins        Current Outpatient Prescriptions   Medication Sig Dispense Refill    FLUoxetine (PROZAC) 20 MG capsule Take 20 mg by mouth once daily.      methylphenidate HCl (RITALIN) 10 MG tablet Take 10 mg by mouth once daily.      mirtazapine (REMERON) 15 MG tablet Take 15 mg by mouth once daily.  0    scopolamine (TRANSDERM-SCOP) 1.3-1.5 mg (1 mg over 3 days) Place 1 patch onto the skin every 72 hours. 2 patch 0    rosuvastatin (CRESTOR) 20 MG tablet Take 1 tablet (20 mg total) by mouth once daily. 90 tablet 3    sodium,potassium,mag sulfates (SUPREP BOWEL PREP KIT) 17.5-3.13-1.6 gram SolR Follow written instructions provided by Clinic 2 Bottle 0     No current facility-administered medications for this visit.        PMFSHx:  Past Medical History:   Diagnosis Date    Allergy     Anxiety     Depression     Hyperlipidemia     Hypertension        Past Surgical History:   Procedure Laterality Date    surgical history denied         Family History   Problem Relation Age of Onset    Heart disease Sister     Lung cancer Sister     Heart disease Brother        Social History   Substance Use Topics    Smoking status: Never Smoker    Smokeless tobacco: Never Used    Alcohol use  Yes      Comment: very rarely       Review of Systems   Constitutional: Negative for appetite change, chills, fatigue, fever and unexpected weight change.   HENT: Negative for congestion, dental problem, ear pain, mouth sores, postnasal drip, rhinorrhea, sore throat, tinnitus, trouble swallowing and voice change.    Eyes: Negative for photophobia, pain, discharge and visual disturbance.   Respiratory: Negative for cough, chest tightness, shortness of breath and wheezing.    Cardiovascular: Negative for chest pain, palpitations and leg swelling.   Gastrointestinal: Negative for abdominal pain, blood in stool, constipation, diarrhea, nausea and vomiting.   Endocrine: Negative for cold intolerance, heat intolerance, polydipsia, polyphagia and polyuria.   Genitourinary: Negative for difficulty urinating, dysuria, flank pain, frequency, hematuria and urgency.   Musculoskeletal: Negative for arthralgias, joint swelling and myalgias.   Skin: Negative for color change and rash.   Allergic/Immunologic: Negative for immunocompromised state.   Neurological: Negative for dizziness, tremors, seizures, syncope, speech difficulty, weakness, numbness and headaches.   Hematological: Negative for adenopathy. Does not bruise/bleed easily.   Psychiatric/Behavioral: Negative for agitation, confusion, hallucinations, self-injury and suicidal ideas. The patient is not nervous/anxious.        Objective:      Physical Exam   Constitutional: She is oriented to person, place, and time. She appears well-developed and well-nourished. She is active.  Non-toxic appearance. No distress.   HENT:   Head: Normocephalic and atraumatic.   Right Ear: Hearing and external ear normal. No drainage or tenderness.   Left Ear: Hearing and external ear normal. No drainage or tenderness.   Nose: Nose normal. No rhinorrhea. No epistaxis.   Mouth/Throat: Uvula is midline, oropharynx is clear and moist and mucous membranes are normal. Mucous membranes are not  pale, not dry and not cyanotic. No oropharyngeal exudate.   Eyes: Conjunctivae and lids are normal. Pupils are equal, round, and reactive to light. Right eye exhibits no discharge and no exudate. Left eye exhibits no discharge and no exudate. Right conjunctiva is not injected. Right eye exhibits no nystagmus. Left eye exhibits no nystagmus.   Neck: Trachea normal, full passive range of motion without pain and phonation normal. No JVD present. Carotid bruit is not present. No tracheal deviation present. No thyroid mass and no thyromegaly present.   Cardiovascular: Normal rate, regular rhythm, S1 normal, S2 normal, normal heart sounds and intact distal pulses.  PMI is not displaced.  Exam reveals no gallop and no friction rub.    No murmur heard.  Pulmonary/Chest: Effort normal and breath sounds normal. No accessory muscle usage. No respiratory distress. She exhibits no mass, no tenderness and no crepitus. Right breast exhibits no inverted nipple, no mass, no nipple discharge, no skin change and no tenderness. Left breast exhibits no inverted nipple, no mass, no nipple discharge, no skin change and no tenderness. Breasts are symmetrical.   Abdominal: Soft. Normal appearance and bowel sounds are normal. She exhibits no distension, no fluid wave, no abdominal bruit and no mass. There is no hepatosplenomegaly. There is no tenderness. There is no rebound, no guarding and negative Eaton's sign. No hernia. Hernia confirmed negative in the right inguinal area and confirmed negative in the left inguinal area.   Genitourinary: Rectum normal and vagina normal. There is no rash or lesion on the right labia. There is no rash or lesion on the left labia. Right adnexum displays no mass. Left adnexum displays no mass. No vaginal discharge found.   Musculoskeletal:        Cervical back: Normal.        Thoracic back: Normal.        Lumbar back: Normal.        Right upper arm: Normal.        Left upper arm: Normal.        Right  forearm: Normal.        Left forearm: Normal.        Right hand: Normal.        Left hand: Normal.        Right upper leg: Normal.        Left upper leg: Normal.        Right lower leg: Normal.        Left lower leg: Normal.        Right foot: Normal.        Left foot: Normal.   Lymphadenopathy:        Head (right side): No submental, no submandibular and no posterior auricular adenopathy present.        Head (left side): No submental, no submandibular and no posterior auricular adenopathy present.     She has no cervical adenopathy.     She has no axillary adenopathy.        Right: No inguinal and no supraclavicular adenopathy present.        Left: No inguinal and no supraclavicular adenopathy present.   Neurological: She is alert and oriented to person, place, and time. She has normal strength. No cranial nerve deficit or sensory deficit. Coordination and gait normal. GCS eye subscore is 4. GCS verbal subscore is 5. GCS motor subscore is 6.   Skin: Skin is warm, dry and intact. No rash noted. No cyanosis. Nails show no clubbing.   Psychiatric: She has a normal mood and affect. Her speech is normal. Judgment and thought content normal. Her mood appears not anxious. Her affect is not inappropriate. She is not actively hallucinating. She does not exhibit a depressed mood.         Test Results:  Pending    Assessment:     Due for screening colonoscopy.  No contraindication evident for procedure or sedation.  Additional preprocedure evaluation with labs and ECG warranted.  Differential diagnosis includes but not limited to colorectal cancer, diverticulosis, hemorrhoids, angiodysplasias, inflammatory bowel disease, etc.  Options include endoscopy, radiologic studies, second opinion, empiric management, observation, capsule endoscopy, etc.     1. Encounter for screening colonoscopy        Plan:   Encounter for screening colonoscopy  -     Case Request Operating Room: COLONOSCOPY  -     Place in Outpatient; Standing  -      Vital signs; Standing  -     Insert peripheral IV; Standing  -     Verify beta-blocker dose taken within 24 hours if patient is prescribed beta-blocker; Standing  -     Height and weight; Standing  -     Verify discontinuation of antithrombotics; Standing  -     Verify blood consent; Standing  -     Verify consent; Standing  -     Diet NPO; Standing  -     Early ambulation; Standing  -     Comprehensive metabolic panel; Future; Expected date: 05/31/2018  -     CBC auto differential; Future; Expected date: 05/31/2018  -     Pulse Oximetry Q15 Min; Standing  -     EKG 12-lead; Future  -     Lipid panel; Future; Expected date: 05/31/2018    Other orders  -     sodium,potassium,mag sulfates (SUPREP BOWEL PREP KIT) 17.5-3.13-1.6 gram SolR; Follow written instructions provided by Clinic  Dispense: 2 Bottle; Refill: 0  -     lidocaine (PF) 10 mg/ml (1%) injection 10 mg; Inject 1 mL (10 mg total) into the skin once.  -     lactated ringers infusion; Inject into the vein continuous.  -     IP VTE LOW RISK PATIENT; Standing        Follow-up in about 6 weeks (around 7/12/2018) for routine post-endosocpy visit.    Counseling/Medical Decision Making:    Kirsten Saunders was counseled regarding the results of the evaluation thus far and the differential diagnosis.  Diagnostic and therapeutic options were discussed in detail along with the risks, benefits, possible complications, details of, and indications for each option.  Diagnoses discussed included but were not limited to: hemorrhoids, diverticulosis, cancer, IBD, IBS, benign tumors, angiodysplasias.  Options discussed included but were not limited to: colonoscopy, proctoscopy, anoscopy, sigmoidoscopy, radiologic studies, PillCam, empiric therapy, second opinion, etc. Possible complications of colonoscopy discussed included but were not limited to: bleeding, infections, endocarditis, injury to internal organs, incomplete exam, failure to diagnose cancer or other serious  condition, need for emergency surgery, need for a temporary colostomy, need for additional operations or procedures, etc.  Entire conversation was held in layman's terms.  Questions solicited and answered.  I read the consent form to her verbatim.  All unfamiliar terms were defined.  Krames booklets were provided on colonoscopy, polyps, diverticulosis, hemorrhoids, cancer, etc.  A copy of the consent form was provided as well for her review outside the office / hospital prior to the procedure.  At the conclusion of the conversation she voiced complete understanding of all we discussed and satisfaction that all of her questions had been answered to her full understanding.  She stated that she felt fully informed and totally capable of making an informed decision.  She desires and requests to proceed with colonoscopy.

## 2018-06-29 NOTE — ANESTHESIA POSTPROCEDURE EVALUATION
"Anesthesia Post Evaluation    Patient: Kirsten Saunders    Procedure(s) Performed: Procedure(s) (LRB):  COLONOSCOPY (N/A)    Final Anesthesia Type: MAC  Patient location during evaluation: PACU  Patient participation: Yes- Able to Participate  Level of consciousness: awake and alert  Post-procedure vital signs: reviewed and stable  Pain management: adequate  Airway patency: patent  PONV status at discharge: No PONV  Anesthetic complications: no      Cardiovascular status: blood pressure returned to baseline  Respiratory status: unassisted  Hydration status: euvolemic  Follow-up not needed.        Visit Vitals  BP (!) 164/100   Pulse 81   Temp 36.5 °C (97.7 °F) (Axillary)   Resp 11   Ht 5' 6" (1.676 m)   Wt 89.8 kg (198 lb)   SpO2 99%   Breastfeeding? No   BMI 31.96 kg/m²       Pain/Sonia Score: Presence of Pain: denies (6/29/2018 11:00 AM)  Sonia Score: 10 (6/29/2018 11:00 AM)      "

## 2018-06-29 NOTE — BRIEF OP NOTE
[facility] - Periop Services  General Surgery  Brief Op Note  06/29/2018    Patient Name: Kirsten Saunders  MRN: [id number]  Admission Date: 6/29/2018      Preop Diagnosis:  Colon cancer screening    Postop Diagnosis:  Normal colonoscopy    Procedure:  Low risk screening colonoscopy    Surgeon:  Ludwin NIXON    Assistant:  None    Anesthesia:  Monitored anesthesia care    EBL:  None    Findings:  Above    Specimens:  None    Complications:  None    Disposition:  PACU stable      Mikie Mascorro MD

## 2018-06-29 NOTE — TRANSFER OF CARE
"Anesthesia Transfer of Care Note    Patient: Kirsten Saunders    Procedure(s) Performed: Procedure(s) (LRB):  COLONOSCOPY (N/A)    Patient location: PACU    Anesthesia Type: MAC    Transport from OR: Transported from OR on room air with adequate spontaneous ventilation    Post pain: adequate analgesia    Post assessment: no apparent anesthetic complications and tolerated procedure well    Post vital signs: stable    Level of consciousness: awake, alert and oriented    Nausea/Vomiting: no nausea/vomiting    Complications: none    Transfer of care protocol was followed      Last vitals:   Visit Vitals  /69   Pulse 80   Temp 36.8 °C (98.3 °F) (Oral)   Resp 20   Ht 5' 6" (1.676 m)   Wt 89.8 kg (198 lb)   SpO2 (!) 94%   Breastfeeding? No   BMI 31.96 kg/m²     "

## 2018-06-29 NOTE — ANESTHESIA PREPROCEDURE EVALUATION
06/29/2018  Kirsten Saunders is a 53 y.o., female.    Anesthesia Evaluation    I have reviewed the Patient Summary Reports.    I have reviewed the Nursing Notes.   I have reviewed the Medications.     Review of Systems  Anesthesia Hx:  No problems with previous Anesthesia  Neg history of prior surgery. Denies Family Hx of Anesthesia complications.   Denies Personal Hx of Anesthesia complications.   Social:  Non-Smoker    Hematology/Oncology:  Hematology Normal        EENT/Dental:EENT/Dental Normal   Cardiovascular:   Hypertension, well controlled    Pulmonary:  Pulmonary Normal    Renal/:  Renal/ Normal     Hepatic/GI:  Hepatic/GI Normal    Musculoskeletal:  Musculoskeletal Normal    Neurological:  Neurology Normal    Endocrine:  Endocrine Normal        Physical Exam  General:  Well nourished    Airway/Jaw/Neck:  AIRWAY FINDINGS: Normal      Eyes/Ears/Nose:  EYES/EARS/NOSE FINDINGS: Normal   Dental:  DENTAL FINDINGS: Normal   Chest/Lungs:  Chest/Lungs Clear    Heart/Vascular:  Heart Findings: Normal Heart murmur: negative Vascular Findings: Normal    Abdomen:  Abdomen Findings: Normal    Musculoskeletal:  Musculoskeletal Findings: Normal   Skin:  Skin Findings: Normal    Mental Status:  Mental Status Findings: Normal        Anesthesia Plan  Type of Anesthesia, risks & benefits discussed:  Anesthesia Type:  MAC  Patient's Preference:   Intra-op Monitoring Plan: standard ASA monitors  Intra-op Monitoring Plan Comments:   Post Op Pain Control Plan:   Post Op Pain Control Plan Comments:   Induction:   IV  Beta Blocker:  Patient is not currently on a Beta-Blocker (No further documentation required).       Informed Consent: Patient understands risks and agrees with Anesthesia plan.  Questions answered. Anesthesia consent signed with patient.  ASA Score: 2     Day of Surgery Review of History & Physical:    H&P  update referred to the provider.         Ready For Surgery From Anesthesia Perspective.

## 2018-06-29 NOTE — OP NOTE
DATE OF PROCEDURE:  06/29/2018.    SURGEON:  Mikie Mascorro M.D.    ASSISTANT:  None.    PREOPERATIVE DIAGNOSIS:  Colon cancer screening.    POSTOPERATIVE DIAGNOSIS:  Normal colonoscopy.    SURGICAL PROCEDURE PERFORMED:  Low risk screening colonoscopy.    ANESTHESIA:  Monitored anesthesia care.    INDICATIONS FOR PROCEDURE:  A 53-year-old female seen in the office in  consultation for colon cancer screening.    SURGICAL FINDINGS:  Successful intubation of the cecum and terminal ileum  confirmed.  Approximately 20 cm of TI was well examined.  The examined  portion of the TI showed no inflammatory changes, neoplasms, vascular  malformations or diverticular disease.  The cecum was identified by the  ileocecal valve and appendiceal orifice.  Cecal intubation time 3 minutes  and extraction time 20 minutes.  Throughout the entire colon, there were no  neoplasm, vascular malformations, inflammatory changes or diverticular  disease.  Anorectal canal showed no hemorrhoids, fissures, fistulas,  neoplasms, vascular malformations, inflammatory changes or rectal  diverticular disease.  No biopsies of the procedure were performed.  She  tolerated the endoscopy well.    BLOOD LOSS:  None.    SPECIMENS:  None.  She was emerged from her sedation without difficulty,  transported to Recovery Room in good condition.  No evident endoscopic  complications.    PROCEDURE:  The informed consent process was reviewed again in preoperative  holding including the risks, benefits, possible complications of, details  of, and indications for each option. The patient again verbalized  understanding and consent. The patient was transported to the procedure  room, and a Time Out was completed. The correct patient, procedure,  operative site, position, consent, and allergies were confirmed by all  members of the operating team. IV sedation was then administered by  anesthesia personnel. The patient was in the left lateral decubitus  position. Visual  anal and digital rectal exams were reperformed. The  colonoscope was then introduced and advanced with irrigation, insufflation,  and suction as necessary to the cecum. It was then extracted in a  retrograde fashion examining all mucosal surfaces circumferentially along  the way. The hepatic flexure, splenic flexure, sigmoid colon, and all  tortuous segments were renavigated multiple times to ensure adequate  examination of these areas. Upon reentering the rectum, the scope was  retroflexed and the distal rectum and proximal anal canal was visualized  from this vantage point. The scope was then returned to an antegrade  position. The scope was then advanced back to the cecum and extracted  again. All mucosal surfaces were again examined circumferentially and all  insufflated air was completely evacuated. The above-mentioned findings were  noted. The above-mentioned procedures were performed during the initial  scope extraction. Upon reaching the rectum the 2nd time, the scope was  extracted. Hemostasis ensured throughout.    RECOMMENDATIONS:  1.  Repeat screening colonoscopy in 10 years.  2.  Reconsult General Surgery as needed.    SPECIMENS:  None.    BLOOD LOSS:  None.    DISPOSITION:  PACU, stable.        BTA/HN dd: 06/29/2018 10:18:09 (CDT)   td: 06/29/2018 18:14:48 (CDT)  Doc ID #[id number]   Job ID #[id number]    CC:

## 2018-06-29 NOTE — [employer] PATIENT INSTRUCTIONS
Discharge Summary/Instructions after an Endoscopic Procedure  Patient Name: Kirsten Saunders  Patient MRN: [id number]  Patient YOB: 1965 Friday, June 29, 2018  Mikie Mascorro MD  RESTRICTIONS:  During your procedure today, you received medications for sedation.  These   medications may affect your judgment, balance and coordination.  Therefore,   for 24 hours, you have the following restrictions:   - DO NOT drive a car, operate machinery, make legal/financial decisions,   sign important papers or drink alcohol.    ACTIVITY:  Today: no heavy lifting, straining or running due to procedural   sedation/anesthesia.  The following day: return to full activity including work.  DIET:  Eat and drink normally unless instructed otherwise.     TREATMENT FOR COMMON SIDE EFFECTS:  - Mild abdominal pain, nausea, belching, bloating or excessive gas:  rest,   eat lightly and use a heating pad.  - Sore Throat: treat with throat lozenges and/or gargle with warm salt   water.  - Because air was used during the procedure, expelling large amounts of air   from your rectum or belching is normal.  - If a bowel prep was taken, you may not have a bowel movement for 1-3 days.    This is normal.  SYMPTOMS TO WATCH FOR AND REPORT TO YOUR PHYSICIAN:  1. Abdominal pain or bloating, other than gas cramps.  2. Chest pain.  3. Back pain.  4. Signs of infection such as: chills or fever occurring within 24 hours   after the procedure.  5. Rectal bleeding, which would show as bright red, maroon, or black stools.   (A tablespoon of blood from the rectum is not serious, especially if   hemorrhoids are present.)  6. Vomiting.  7. Weakness or dizziness.  GO DIRECTLY TO THE NEAREST EMERGENCY ROOM IF YOU HAVE ANY OF THE FOLLOWING:      Difficulty breathing              Chills and/or fever over 101 F   Persistent vomiting and/or vomiting blood   Severe abdominal pain   Severe chest pain   Black, tarry stools   Bleeding- more than one  tablespoon   Any other symptom or condition that you feel may need urgent attention  Your doctor recommends these additional instructions:  If any biopsies were taken, your doctors clinic will contact you in 1 to 2   weeks with any results.  - Discharge patient to home.   - Resume previous diet.   - Continue present medications.   - Repeat colonoscopy in 10 years for screening purposes.   - Return to physician assistant as previously scheduled.  For questions, problems or results please call your physician - Mikie Mascorro MD at Work:  [phone].  [facility] EMERGENCY ROOM PHONE NUMBER: [phone]  IF A COMPLICATION OR EMERGENCY SITUATION ARISES AND YOU ARE UNABLE TO REACH   YOUR PHYSICIAN - GO DIRECTLY TO THE EMERGENCY ROOM.  MD Mikie Rivero MD  6/29/2018 10:12:15 AM  This report has been verified and signed electronically.  [employer]

## 2018-06-29 NOTE — DISCHARGE SUMMARY
[facility]  Discharge Note  Short Stay    Admit Date: 6/29/2018    Discharge Date and Time: No discharge date for patient encounter.     Attending Physician: Mikie Mascorro MD     Discharge Provider: Mikie Mascorro    Diagnoses:  Active Hospital Problems    Diagnosis  POA    Encounter for screening colonoscopy [Z12.11]  Not Applicable      Resolved Hospital Problems    Diagnosis Date Resolved POA   No resolved problems to display.       Discharged Condition: good    Hospital Course: Patient was admitted for an outpatient procedure and tolerated the procedure well with no complications.    Final Diagnoses: Same as principal problem.    Disposition: Home or Self Care    Follow up/Patient Instructions:    Medications:  Reconciled Home Medications:      Medication List      CONTINUE taking these medications    FLUoxetine 20 MG capsule  Commonly known as:  PROZAC  Take 20 mg by mouth once daily.     methylphenidate HCl 10 MG tablet  Commonly known as:  RITALIN  Take 10 mg by mouth once daily.     mirtazapine 15 MG tablet  Commonly known as:  REMERON  Take 15 mg by mouth once daily.            Discharge Procedure Orders  Diet Adult Regular     No driving until:   Order Specific Question Answer Comments   Date: 6/30/2018        Follow-up Information     Schedule an appointment as soon as possible for a visit with Marzena Willson DO.    Specialty:  Family Medicine  Why:  As scheduled  Contact information:  [address] [place] [id number] [phone]                   Discharge Procedure Orders (must include Diet, Follow-up, Activity):    Discharge Procedure Orders (must include Diet, Follow-up, Activity)  Diet Adult Regular     No driving until:   Order Specific Question Answer Comments   Date: 6/30/2018

## 2018-07-12 ENCOUNTER — OFFICE VISIT (OUTPATIENT)
Dept: FAMILY MEDICINE | Facility: CLINIC | Age: 53
End: 2018-07-12
Payer: COMMERCIAL

## 2018-07-12 ENCOUNTER — TELEPHONE (OUTPATIENT)
Dept: FAMILY MEDICINE | Facility: CLINIC | Age: 53
End: 2018-07-12

## 2018-07-12 VITALS
HEART RATE: 80 BPM | TEMPERATURE: 97 F | OXYGEN SATURATION: 96 % | RESPIRATION RATE: 18 BRPM | DIASTOLIC BLOOD PRESSURE: 73 MMHG | BODY MASS INDEX: 31.82 KG/M2 | HEIGHT: 66 IN | SYSTOLIC BLOOD PRESSURE: 138 MMHG | WEIGHT: 198 LBS

## 2018-07-12 DIAGNOSIS — Z12.39 SCREENING FOR MALIGNANT NEOPLASM OF BREAST: ICD-10-CM

## 2018-07-12 DIAGNOSIS — E78.00 PURE HYPERCHOLESTEROLEMIA: Primary | ICD-10-CM

## 2018-07-12 PROCEDURE — 99213 OFFICE O/P EST LOW 20 MIN: CPT | Mod: S$GLB,,, | Performed by: FAMILY MEDICINE

## 2018-07-12 RX ORDER — COLESEVELAM 180 1/1
1875 TABLET ORAL 2 TIMES DAILY WITH MEALS
Qty: 540 TABLET | Refills: 3 | Status: SHIPPED | OUTPATIENT
Start: 2018-07-12 | End: 2019-05-23 | Stop reason: SDUPTHER

## 2018-07-12 NOTE — PROGRESS NOTES
Subjective:       Patient ID: Kirsten Saunders is a 53 y.o. female.    Chief Complaint: Follow-up    Hyperlipidemia   This is a chronic problem. The problem is uncontrolled. Recent lipid tests were reviewed and are high. Exacerbating diseases include obesity. Pertinent negatives include no chest pain or shortness of breath. She is currently on no antihyperlipidemic treatment. Compliance problems include medication side effects.  Risk factors for coronary artery disease include obesity.     Review of Systems   Constitutional: Negative for chills, fatigue, fever and unexpected weight change.   Respiratory: Negative for cough, chest tightness, shortness of breath and wheezing.    Cardiovascular: Negative for chest pain, palpitations and leg swelling.   Psychiatric/Behavioral: Negative for decreased concentration, dysphoric mood, hallucinations and suicidal ideas.       Past Medical History:   Diagnosis Date    Allergy     Anxiety     Depression     Hyperlipidemia     Hypertension      Past Surgical History:   Procedure Laterality Date    COLONOSCOPY N/A 6/29/2018    Procedure: COLONOSCOPY;  Surgeon: Mikie Mascorro MD;  Location: [facility];  Service: Endoscopy;  Laterality: N/A;    surgical history denied       Social History     Social History    Marital status:      Spouse name: N/A    Number of children: N/A    Years of education: N/A     Occupational History    Not on file.     Social History Main Topics    Smoking status: Former Smoker     Years: 3.00     Types: Cigarettes     Quit date: 1988    Smokeless tobacco: Never Used    Alcohol use Yes      Comment: occasinal    Drug use: No    Sexual activity: Not on file     Other Topics Concern    Not on file     Social History Narrative    No narrative on file     Family History   Problem Relation Age of Onset    Heart disease Sister     Lung cancer Sister     Heart disease Brother        Objective:      /73   Pulse 80   Temp 97.4 °F  "(36.3 °C) (Tympanic)   Resp 18   Ht 5' 6" (1.676 m)   Wt 89.8 kg (198 lb)   SpO2 96%   BMI 31.96 kg/m²   Physical Exam   Constitutional: She is oriented to person, place, and time. She appears well-developed and well-nourished. No distress.   Cardiovascular: Normal rate, regular rhythm and normal heart sounds.    No murmur heard.  Neurological: She is alert and oriented to person, place, and time.   Skin: Skin is warm and dry. No rash noted. She is not diaphoretic.   Psychiatric: She has a normal mood and affect. Her behavior is normal. Judgment and thought content normal.   Vitals reviewed.      Assessment:       1. Pure hypercholesterolemia    2. Screening for malignant neoplasm of breast        Plan:       Pure hypercholesterolemia  -     Cannot tolerate statins, as they worsen her depression; start medication below  -     colesevelam (WELCHOL) 625 mg tablet; Take 3 tablets (1,875 mg total) by mouth 2 (two) times daily with meals.  Dispense: 540 tablet; Refill: 3  -     Lipid panel; Future; Expected date: 10/12/2018  -     Comprehensive metabolic panel; Future; Expected date: 10/12/2018    Screening for malignant neoplasm of breast  -     Mammo Digital Screening Bilateral With CAD; Future; Expected date: 07/12/2018            Risks, benefits, and side effects were discussed with the patient. All questions were answered to the fullest satisfaction of the patient, and pt verbalized understanding and agreement to treatment plan. Pt was to call with any new or worsening symptoms, or present to the ER.    "

## 2018-07-12 NOTE — TELEPHONE ENCOUNTER
Pt called the office asking if she can have and order for a Mammo. She received a notice in the mail.

## 2018-07-19 ENCOUNTER — HOSPITAL ENCOUNTER (OUTPATIENT)
Dept: RADIOLOGY | Facility: HOSPITAL | Age: 53
Discharge: HOME | End: 2018-07-19
Attending: FAMILY MEDICINE
Payer: COMMERCIAL

## 2018-07-19 VITALS — BODY MASS INDEX: 31.66 KG/M2 | HEIGHT: 66 IN | WEIGHT: 197 LBS

## 2018-07-19 DIAGNOSIS — Z12.39 SCREENING FOR MALIGNANT NEOPLASM OF BREAST: ICD-10-CM

## 2018-07-19 PROCEDURE — 77067 SCR MAMMO BI INCL CAD: CPT | Mod: 26,,, | Performed by: RADIOLOGY

## 2018-07-19 PROCEDURE — 77067 SCR MAMMO BI INCL CAD: CPT | Mod: TC

## 2018-10-07 ENCOUNTER — LAB VISIT (OUTPATIENT)
Dept: LAB | Facility: HOSPITAL | Age: 53
End: 2018-10-07
Attending: FAMILY MEDICINE
Payer: COMMERCIAL

## 2018-10-07 DIAGNOSIS — E78.00 PURE HYPERCHOLESTEROLEMIA: Primary | ICD-10-CM

## 2018-10-07 LAB
ALBUMIN SERPL BCP-MCNC: 3.8 G/DL
ALP SERPL-CCNC: 128 U/L
ALT SERPL W/O P-5'-P-CCNC: 59 U/L
ANION GAP SERPL CALC-SCNC: 8 MMOL/L
AST SERPL-CCNC: 29 U/L
BILIRUB SERPL-MCNC: 0.8 MG/DL
BUN SERPL-MCNC: 14 MG/DL
CALCIUM SERPL-MCNC: 9.2 MG/DL
CHLORIDE SERPL-SCNC: 109 MMOL/L
CHOLEST SERPL-MCNC: 249 MG/DL
CHOLEST/HDLC SERPL: 5.1 {RATIO}
CO2 SERPL-SCNC: 26 MMOL/L
CREAT SERPL-MCNC: 0.9 MG/DL
EST. GFR  (AFRICAN AMERICAN): >60 ML/MIN/1.73 M^2
EST. GFR  (NON AFRICAN AMERICAN): >60 ML/MIN/1.73 M^2
GLUCOSE SERPL-MCNC: 86 MG/DL
HDLC SERPL-MCNC: 49 MG/DL
HDLC SERPL: 19.7 %
LDLC SERPL CALC-MCNC: 151.8 MG/DL
NONHDLC SERPL-MCNC: 200 MG/DL
POTASSIUM SERPL-SCNC: 3.7 MMOL/L
PROT SERPL-MCNC: 7 G/DL
SODIUM SERPL-SCNC: 143 MMOL/L
TRIGL SERPL-MCNC: 241 MG/DL

## 2018-10-07 PROCEDURE — 80061 LIPID PANEL: CPT

## 2018-10-07 PROCEDURE — 36415 COLL VENOUS BLD VENIPUNCTURE: CPT

## 2018-10-07 PROCEDURE — 80053 COMPREHEN METABOLIC PANEL: CPT

## 2018-10-08 ENCOUNTER — OFFICE VISIT (OUTPATIENT)
Dept: FAMILY MEDICINE | Facility: CLINIC | Age: 53
End: 2018-10-08
Payer: COMMERCIAL

## 2018-10-08 VITALS
BODY MASS INDEX: 33.43 KG/M2 | WEIGHT: 208 LBS | OXYGEN SATURATION: 97 % | DIASTOLIC BLOOD PRESSURE: 84 MMHG | SYSTOLIC BLOOD PRESSURE: 143 MMHG | RESPIRATION RATE: 18 BRPM | HEART RATE: 68 BPM | HEIGHT: 66 IN

## 2018-10-08 DIAGNOSIS — E78.00 PURE HYPERCHOLESTEROLEMIA: Primary | ICD-10-CM

## 2018-10-08 PROBLEM — K58.0 IRRITABLE BOWEL SYNDROME WITH DIARRHEA: Status: ACTIVE | Noted: 2018-10-08

## 2018-10-08 PROCEDURE — 99213 OFFICE O/P EST LOW 20 MIN: CPT | Mod: S$GLB,,, | Performed by: FAMILY MEDICINE

## 2018-10-08 PROCEDURE — 3008F BODY MASS INDEX DOCD: CPT | Mod: S$GLB,,, | Performed by: FAMILY MEDICINE

## 2018-10-08 RX ORDER — ESCITALOPRAM OXALATE 10 MG/1
TABLET ORAL
Refills: 0 | COMMUNITY
Start: 2018-10-04 | End: 2019-02-11

## 2018-10-08 RX ORDER — KETOCONAZOLE 20 MG/G
CREAM TOPICAL
Refills: 3 | COMMUNITY
Start: 2018-10-04

## 2018-10-08 NOTE — PROGRESS NOTES
Subjective:       Patient ID: Kirsten Saunders is a 53 y.o. female.    Chief Complaint: Follow-up    Hyperlipidemia   This is a chronic problem. The problem is controlled. Recent lipid tests were reviewed and are variable. Exacerbating diseases include obesity. She has no history of hypothyroidism. There are no known factors aggravating her hyperlipidemia. Pertinent negatives include no chest pain or shortness of breath. Current antihyperlipidemic treatment includes bile acid squestrants. The current treatment provides moderate improvement of lipids. Risk factors for coronary artery disease include obesity.     Review of Systems   Constitutional: Negative for chills, fatigue, fever and unexpected weight change.   Respiratory: Negative for cough, chest tightness, shortness of breath and wheezing.    Cardiovascular: Negative for chest pain, palpitations and leg swelling.   Neurological: Negative for dizziness, tremors, seizures and headaches.   Psychiatric/Behavioral: Negative for decreased concentration, dysphoric mood, hallucinations and suicidal ideas.       Past Medical History:   Diagnosis Date    Allergy     Anxiety     Depression     Hyperlipidemia     Hypertension      Past Surgical History:   Procedure Laterality Date    COLONOSCOPY N/A 6/29/2018    Procedure: COLONOSCOPY;  Surgeon: Mikie Mascorro MD;  Location: [facility];  Service: Endoscopy;  Laterality: N/A;    COLONOSCOPY N/A 6/29/2018    Performed by [facility] at [facility] ENDO    surgical history denied       Social History     Socioeconomic History    Marital status:      Spouse name: Not on file    Number of children: Not on file    Years of education: Not on file    Highest education level: Not on file   Social Needs    Financial resource strain: Not on file    Food insecurity - worry: Not on file    Food insecurity - inability: Not on file    Transportation needs - medical: Not on file    Transportation needs -  "non-medical: Not on file   Occupational History    Not on file   Tobacco Use    Smoking status: Former Smoker     Years: 3.00     Types: Cigarettes     Last attempt to quit:      Years since quittin.7    Smokeless tobacco: Never Used   Substance and Sexual Activity    Alcohol use: Yes     Comment: occasinal    Drug use: No    Sexual activity: Yes     Partners: Male     Birth control/protection: Other-see comments     Comment: postmen.    Other Topics Concern    Not on file   Social History Narrative    Not on file     Family History   Problem Relation Age of Onset    Heart disease Sister     Lung cancer Sister     Heart disease Brother     Breast cancer Neg Hx        Objective:      BP (!) 143/84   Pulse 68   Resp 18   Ht 5' 6" (1.676 m)   Wt 94.3 kg (208 lb)   SpO2 97%   BMI 33.57 kg/m²   Physical Exam   Constitutional: She is oriented to person, place, and time. She appears well-developed and well-nourished. No distress.   obese   Cardiovascular: Normal rate, regular rhythm and normal heart sounds.   No murmur heard.  Pulmonary/Chest: Effort normal and breath sounds normal. No stridor. No respiratory distress.   Neurological: She is alert and oriented to person, place, and time.   Skin: She is not diaphoretic.   Psychiatric: She has a normal mood and affect. Her behavior is normal. Judgment and thought content normal.   Vitals reviewed.      Assessment:       1. Pure hypercholesterolemia        Plan:       Pure hypercholesterolemia  - significant improvement with welchol; continue this medication and recheck fasting lipids in 3-6 months          Risks, benefits, and side effects were discussed with the patient. All questions were answered to the fullest satisfaction of the patient, and pt verbalized understanding and agreement to treatment plan. Pt was to call with any new or worsening symptoms, or present to the ER.    "

## 2019-02-10 ENCOUNTER — LAB VISIT (OUTPATIENT)
Dept: LAB | Facility: HOSPITAL | Age: 54
End: 2019-02-10
Attending: FAMILY MEDICINE
Payer: COMMERCIAL

## 2019-02-10 DIAGNOSIS — E78.00 PURE HYPERCHOLESTEROLEMIA: ICD-10-CM

## 2019-02-10 LAB
ALBUMIN SERPL BCP-MCNC: 4 G/DL
ALP SERPL-CCNC: 126 U/L
ALT SERPL W/O P-5'-P-CCNC: 62 U/L
ANION GAP SERPL CALC-SCNC: 9 MMOL/L
AST SERPL-CCNC: 30 U/L
BILIRUB SERPL-MCNC: 1 MG/DL
BUN SERPL-MCNC: 18 MG/DL
CALCIUM SERPL-MCNC: 9 MG/DL
CHLORIDE SERPL-SCNC: 105 MMOL/L
CHOLEST SERPL-MCNC: 304 MG/DL
CHOLEST/HDLC SERPL: 6 {RATIO}
CO2 SERPL-SCNC: 24 MMOL/L
CREAT SERPL-MCNC: 0.9 MG/DL
EST. GFR  (AFRICAN AMERICAN): >60 ML/MIN/1.73 M^2
EST. GFR  (NON AFRICAN AMERICAN): >60 ML/MIN/1.73 M^2
GLUCOSE SERPL-MCNC: 73 MG/DL
HDLC SERPL-MCNC: 51 MG/DL
HDLC SERPL: 16.8 %
LDLC SERPL CALC-MCNC: 194.4 MG/DL
NONHDLC SERPL-MCNC: 253 MG/DL
POTASSIUM SERPL-SCNC: 4.3 MMOL/L
PROT SERPL-MCNC: 7.6 G/DL
SODIUM SERPL-SCNC: 138 MMOL/L
TRIGL SERPL-MCNC: 293 MG/DL

## 2019-02-10 PROCEDURE — 36415 COLL VENOUS BLD VENIPUNCTURE: CPT

## 2019-02-10 PROCEDURE — 80053 COMPREHEN METABOLIC PANEL: CPT

## 2019-02-10 PROCEDURE — 80061 LIPID PANEL: CPT

## 2019-02-11 ENCOUNTER — OFFICE VISIT (OUTPATIENT)
Dept: FAMILY MEDICINE | Facility: CLINIC | Age: 54
End: 2019-02-11
Payer: COMMERCIAL

## 2019-02-11 VITALS
RESPIRATION RATE: 18 BRPM | SYSTOLIC BLOOD PRESSURE: 135 MMHG | DIASTOLIC BLOOD PRESSURE: 97 MMHG | BODY MASS INDEX: 34.17 KG/M2 | HEIGHT: 66 IN | WEIGHT: 212.63 LBS | OXYGEN SATURATION: 97 % | HEART RATE: 82 BPM

## 2019-02-11 DIAGNOSIS — Z01.419 VISIT FOR GYNECOLOGIC EXAMINATION: Primary | ICD-10-CM

## 2019-02-11 DIAGNOSIS — E78.2 MIXED HYPERLIPIDEMIA: ICD-10-CM

## 2019-02-11 DIAGNOSIS — K92.0 HEMATEMESIS WITH NAUSEA: ICD-10-CM

## 2019-02-11 DIAGNOSIS — R74.01 ELEVATED ALT MEASUREMENT: ICD-10-CM

## 2019-02-11 PROCEDURE — 88142 CYTOPATH C/V THIN LAYER: CPT | Performed by: PATHOLOGY

## 2019-02-11 PROCEDURE — 88141 CYTOPATH C/V INTERPRET: CPT | Mod: ,,, | Performed by: PATHOLOGY

## 2019-02-11 PROCEDURE — 99396 PR PREVENTIVE VISIT,EST,40-64: ICD-10-PCS | Mod: S$GLB,,, | Performed by: FAMILY MEDICINE

## 2019-02-11 PROCEDURE — 99396 PREV VISIT EST AGE 40-64: CPT | Mod: S$GLB,,, | Performed by: FAMILY MEDICINE

## 2019-02-11 PROCEDURE — 88141 LIQUID-BASED PAP SMEAR, SCREENING: ICD-10-PCS | Mod: ,,, | Performed by: PATHOLOGY

## 2019-02-11 RX ORDER — VORTIOXETINE 10 MG/1
TABLET, FILM COATED ORAL
Refills: 0 | COMMUNITY
Start: 2019-01-23

## 2019-02-11 NOTE — PROGRESS NOTES
Subjective:       Patient ID: Kirsten Saunders is a 53 y.o. female.    Chief Complaint: Follow-up    HPI   Ms. Saunders presents for followup and pap smear. No history of abnormal pap smears. She is post-menopausal. Denies vaginal bleeding and discharge.    Has been taking welchol bid rather than tid out of concern it's contributing to nausea she's been having for several months. Cholesterol has subsequently worsened. Also has a history of fatty liver disease, and her ALT has been elevated at the past two lab visits. Complains of nausea most days, after eating, and states that approximately once every 2 weeks she'll wake up in the middle of the night and have to vomit. Reports that the last time she vomited, there was blood in it.    Review of Systems   Constitutional: Negative for chills, diaphoresis, fatigue and fever.   Gastrointestinal: Positive for nausea and vomiting. Negative for blood in stool and constipation.   Genitourinary: Negative for menstrual problem, pelvic pain, vaginal bleeding and vaginal discharge.       Past Medical History:   Diagnosis Date    Allergy     Anxiety     Depression     Hyperlipidemia     Hypertension      Past Surgical History:   Procedure Laterality Date    COLONOSCOPY N/A 6/29/2018    Performed by [facility] MD at [facility] ENDO    surgical history denied       Social History     Socioeconomic History    Marital status:      Spouse name: Not on file    Number of children: Not on file    Years of education: Not on file    Highest education level: Not on file   Social Needs    Financial resource strain: Not on file    Food insecurity - worry: Not on file    Food insecurity - inability: Not on file    Transportation needs - medical: Not on file    Transportation needs - non-medical: Not on file   Occupational History    Not on file   Tobacco Use    Smoking status: Former Smoker     Years: 3.00     Types: Cigarettes     Last attempt to quit: 1988     Years  "since quittin.1    Smokeless tobacco: Never Used   Substance and Sexual Activity    Alcohol use: Yes     Comment: occasinal    Drug use: No    Sexual activity: Yes     Partners: Male     Birth control/protection: Other-see comments     Comment: postmen.    Other Topics Concern    Not on file   Social History Narrative    Not on file     Family History   Problem Relation Age of Onset    Heart disease Sister     Lung cancer Sister     Heart disease Brother     Breast cancer Neg Hx        Objective:      BP (!) 135/97 (BP Location: Left arm, Patient Position: Sitting, BP Method: Medium (Automatic))   Pulse 82   Resp 18   Ht 5' 6" (1.676 m)   Wt 96.4 kg (212 lb 9.6 oz)   LMP  (LMP Unknown)   SpO2 97%   BMI 34.31 kg/m²   Physical Exam   Constitutional: She is oriented to person, place, and time. She appears well-developed and well-nourished. No distress.   obese   Genitourinary: Vagina normal and uterus normal. No vaginal discharge found.   Neurological: She is alert and oriented to person, place, and time.   Skin: She is not diaphoretic.   Psychiatric: She has a normal mood and affect. Her behavior is normal. Judgment and thought content normal.   Vitals reviewed.      Assessment:       1. Visit for gynecologic examination    2. Mixed hyperlipidemia    3. Elevated ALT measurement    4. Hematemesis with nausea        Plan:       Visit for gynecologic examination  -     Liquid-based pap smear, screening    Mixed hyperlipidemia  -     Go back up to taking welchol TID  -     Lipid panel; Future; Expected date: 2019  -     Comprehensive metabolic panel; Future; Expected date: 2019    Elevated ALT measurement  -     Secondary to fatty liver disease; cannot tolerate statins  -     Take welchol TID; practice time-restricted eating (ie, no calories outside of a 12 hour window)  -     Comprehensive metabolic panel; Future; Expected date: 2019    Hematemesis with nausea  -     Ambulatory " referral to General Surgery            Risks, benefits, and side effects were discussed with the patient. All questions were answered to the fullest satisfaction of the patient, and pt verbalized understanding and agreement to treatment plan. Pt was to call with any new or worsening symptoms, or present to the ER.

## 2019-02-26 ENCOUNTER — OFFICE VISIT (OUTPATIENT)
Dept: SURGERY | Facility: CLINIC | Age: 54
End: 2019-02-26
Payer: COMMERCIAL

## 2019-02-26 VITALS
DIASTOLIC BLOOD PRESSURE: 86 MMHG | HEIGHT: 66 IN | OXYGEN SATURATION: 98 % | RESPIRATION RATE: 18 BRPM | BODY MASS INDEX: 34.4 KG/M2 | HEART RATE: 78 BPM | WEIGHT: 214.06 LBS | TEMPERATURE: 98 F | SYSTOLIC BLOOD PRESSURE: 150 MMHG

## 2019-02-26 DIAGNOSIS — R10.13 EPIGASTRIC PAIN: Primary | ICD-10-CM

## 2019-02-26 DIAGNOSIS — R11.0 NAUSEA: ICD-10-CM

## 2019-02-26 PROCEDURE — 99215 OFFICE O/P EST HI 40 MIN: CPT | Mod: S$GLB,,, | Performed by: SURGERY

## 2019-02-26 PROCEDURE — 99215 PR OFFICE/OUTPT VISIT, EST, LEVL V, 40-54 MIN: ICD-10-PCS | Mod: S$GLB,,, | Performed by: SURGERY

## 2019-02-26 RX ORDER — LIDOCAINE HYDROCHLORIDE 10 MG/ML
1 INJECTION, SOLUTION EPIDURAL; INFILTRATION; INTRACAUDAL; PERINEURAL ONCE
Status: CANCELLED | OUTPATIENT
Start: 2019-02-26 | End: 2019-02-26

## 2019-02-26 RX ORDER — SODIUM CHLORIDE, SODIUM LACTATE, POTASSIUM CHLORIDE, CALCIUM CHLORIDE 600; 310; 30; 20 MG/100ML; MG/100ML; MG/100ML; MG/100ML
INJECTION, SOLUTION INTRAVENOUS CONTINUOUS
Status: CANCELLED | OUTPATIENT
Start: 2019-02-26

## 2019-02-26 NOTE — H&P (VIEW-ONLY)
"[facility] - General Surgery  General Surgery  H&P      Patient Name: Kirsten Saunders  MRN: [id number]     Chief Complaint: "I am here to be scoped."    HPI:  Ms. Saunders presents today as a consult from Dr. Elkins.  She has been experiencing intermittent nausea for approximately a year.  She is now also experiencing epigastric abdominal pain. She has had occasional episodes of emesis.  Recently she had an episode of red colored emesis possibly representing hematemesis.  No weight loss.  No melena or hematochezia.  She has a history of IBS.  IBS symptoms have improved since therapy with Welchol for hyperlipidemia.  No recent change in bowel habits.  No other associated symptoms.  No specific food intolerance.  No aggravating factors.  No alleviating factors.          Allergies & Meds:  Allergen Reactions    Penicillins Itching       Current Outpatient Medications   Medication Sig Dispense Refill    AFLURIA QUAD [id number], PF, 60 mcg/0.5 mL vaccine ADM 0.5ML IM UTD  0    colesevelam (WELCHOL) 625 mg tablet Take 3 tablets (1,875 mg total) by mouth 2 (two) times daily with meals. 540 tablet 3    ketoconazole (NIZORAL) 2 % cream ALDAIR EXT AA QD  3    mirtazapine (REMERON) 15 MG tablet Take 15 mg by mouth once daily.  0    TRINTELLIX 10 mg Tab TK 1 T PO QD  0         PMFSHx:  Past Medical History:   Diagnosis Date    Allergy     Anxiety     Depression     Hyperlipidemia     Hypertension        Past Surgical History:   Procedure Laterality Date    COLONOSCOPY N/A 2018    Performed by [facility]orro [facility] at [facility] ENDO    surgical history denied         Family History   Problem Relation Age of Onset    Heart disease Sister     Lung cancer Sister     Heart disease Brother     Breast cancer Neg Hx        Social History     Tobacco Use    Smoking status: Former Smoker     Years: 3.00     Types: Cigarettes     Last attempt to quit:      Years since quittin.1    Smokeless tobacco: " Never Used   Substance Use Topics    Alcohol use: Yes     Comment: occasinal    Drug use: No       Review of Systems   Constitutional: Negative for appetite change, chills, fatigue, fever and unexpected weight change.   HENT: Negative for congestion, dental problem, ear pain, mouth sores, postnasal drip, rhinorrhea, sore throat, tinnitus, trouble swallowing and voice change.    Eyes: Negative for photophobia, pain, discharge and visual disturbance.   Respiratory: Negative for cough, chest tightness, shortness of breath and wheezing.   Cardiovascular: Negative for chest pain, palpitations and leg swelling.   Gastrointestinal: Negative for abdominal pain, blood in stool, constipation, diarrhea, nausea and vomiting.   Endocrine: Negative for cold intolerance, heat intolerance, polydipsia, polyphagia and polyuria.   Genitourinary: Negative for difficulty urinating, dysuria, flank pain, frequency, hematuria and urgency.   Musculoskeletal: Negative for arthralgias, joint swelling and myalgias.   Skin: Negative for color change and rash.   Allergic/Immunologic: Negative for immunocompromised state.   Neurological: Negative for dizziness, tremors, seizures, syncope, speech difficulty, weakness, numbness and headaches.   Hematological: Negative for adenopathy. Does not bruise/bleed easily.   Psychiatric/Behavioral: Negative for agitation, confusion, hallucinations, self-injury and suicidal ideas. The patient is not nervous/anxious.           Physical Exam   Constitutional: She is oriented to person, place, and time. She appears well-developed and well-nourished. She is active.  Non-toxic appearance. No distress. Body mass index is 34.55 kg/m².   HENT:   Head: Normocephalic and atraumatic.   Right Ear: Hearing and external ear normal. No drainage or tenderness.   Left Ear: Hearing and external ear normal. No drainage or tenderness.   Nose: Nose normal. No rhinorrhea. No epistaxis.   Mouth/Throat: Uvula is midline,  oropharynx is clear and moist and mucous membranes are normal. Mucous membranes are not pale, not dry and not cyanotic. No oropharyngeal exudate.   Eyes: Conjunctivae and lids are normal. Pupils are equal, round, and reactive to light. Right eye exhibits no discharge and no exudate. Left eye exhibits no discharge and no exudate. Right conjunctiva is not injected. Right eye exhibits no nystagmus. Left eye exhibits no nystagmus.   Neck: Trachea normal, full passive range of motion without pain and phonation normal. No JVD present. Carotid bruit is not present. No tracheal deviation present. No thyroid mass and no thyromegaly present.   Cardiovascular: Normal rate, regular rhythm, S1 normal, S2 normal, normal heart sounds and intact distal pulses. PMI is not displaced. Exam reveals no gallop and no friction rub.   No murmur heard.  Pulmonary/Chest: Effort normal and breath sounds normal. No accessory muscle usage. No respiratory distress. She exhibits no mass, no tenderness and no crepitus. Right breast exhibits no inverted nipple, no mass, no nipple discharge, no skin change and no tenderness. Left breast exhibits no inverted nipple, no mass, no nipple discharge, no skin change and no tenderness. Breasts are symmetrical.   Abdominal: Soft. Normal appearance and bowel sounds are normal. She exhibits no distension, no fluid wave, no abdominal bruit and no mass. There is no hepatosplenomegaly. There is no tenderness. There is no rebound, no guarding and negative Eaton's sign. No hernia. Hernia confirmed negative in the right inguinal area and confirmed negative in the left inguinal area.   Genitourinary: Rectum normal and vagina normal. There is no rash or lesion on the right labia. There is no rash or lesion on the left labia. Right adnexum displays no mass. Left adnexum displays no mass. No vaginal discharge found.   Musculoskeletal:        Cervical back: Normal.        Thoracic back: Normal.        Lumbar back:  Normal.        Right upper arm: Normal.        Left upper arm: Normal.        Right forearm: Normal.        Left forearm: Normal.        Right hand: Normal.        Left hand: Normal.        Right upper leg: Normal.        Left upper leg: Normal.        Right lower leg: Normal.        Left lower leg: Normal.        Right foot: Normal.        Left foot: Normal.   Lymphadenopathy:        Head (right side): No submental, no submandibular and no posterior auricular adenopathy present.        Head (left side): No submental, no submandibular and no posterior auricular adenopathy present.     She has no cervical adenopathy.     She has no axillary adenopathy.        Right: No inguinal and no supraclavicular adenopathy present.        Left: No inguinal and no supraclavicular adenopathy present.   Neurological: She is alert and oriented to person, place, and time. She has normal strength. No cranial nerve deficit or sensory deficit. Coordination and gait normal. GCS eye subscore is 4. GCS verbal subscore is 5. GCS motor subscore is 6.   Skin: Skin is warm, dry and intact. No rash noted. No cyanosis. Nails show no clubbing.   Psychiatric: She has a normal mood and affect. Her speech is normal. Judgment and thought content normal. Her mood appears not anxious. Her affect is not inappropriate. She is not actively hallucinating. She does not exhibit a depressed mood.         Test Results:  Pending    Assessment:       Epigastric abdominal pain with chronic intermittent nausea and occasional emesis.  Possible recent hematemesis.  Epigastric abdominal pain. Differential diagnosis includes is not limited to esophagitis, hiatal hernia, gastritis, gastric ulcer, duodenitis, biliary disease, esophageal neoplasm, gastric neoplasm, etc. Options include but are not limited to endoscopy, 2nd opinion, laparoscopy, radiologic studies, etc.  Multiple comorbid issues ( hyperlipidemia, hypertension ) increase the complexity of required  perioperative evaluation & management, risks of complications, and likely will increase the difficulty and complexity of postoperative care, etc.  These issues must be factored in to preoperative evaluation and perioperative management.    1. Epigastric pain    2. Nausea        Plan:   Epigastric pain  -     Case Request Operating Room: ESOPHAGOGASTRODUODENOSCOPY (EGD)  -     Full code; Standing  -     Place in Outpatient; Standing  -     Vital signs; Standing  -     Insert peripheral IV; Standing  -     Verify beta-blocker dose taken within 24 hours if patient is prescribed beta-blocker; Standing  -     Height and weight; Standing  -     Verify discontinuation of antithrombotics; Standing  -     POCT glucose; Standing  -     Verify blood consent; Standing  -     Verify consent; Standing  -     Diet NPO; Standing  -     Comprehensive metabolic panel; Future; Expected date: 02/26/2019  -     CBC auto differential; Future; Expected date: 02/26/2019  -     Pregnancy, urine rapid; Future; Expected date: 02/26/2019  -     EKG 12-lead; Future  -     Amylase; Future; Expected date: 02/26/2019  -     Lipase; Future; Expected date: 02/26/2019  -     US Abdomen Limited; Future; Expected date: 02/26/2019    Nausea    Other orders  -     lidocaine (PF) 10 mg/ml (1%) injection 10 mg  -     lactated ringers infusion  -     IP VTE LOW RISK PATIENT; Standing        Follow-up around 4/9/2019 for routine post-endosocpy visit.    Counseling/Medical Decision Making:  Kirsten Saunders was counseled on the results of the evaluation thus far and the differential diagnosis as well as the diagnostic and therapeutic options.  Risks, benefits, possible complications, details of, and indications for each option were also discussed.  Diagnoses discussed included but were not limited to: esophagitis, gastritis, ulcer disease, neoplastic disease, GERD, hiatal hernia, angiodysplasias, etc.  Options discussed included but were not limited to:  radiologic studies, empiric medical management, observation, second opinion, PillCam, EGD.  Possible complications of EGD discussed included but were not limited to: bleeding, injury to internal organs, infections, endocarditis, incomplete exam, failure to diagnose a cancer or other serious conditions, need for emergency surgery, need for additional operations or procedures.  Entire conversation held in laymans terms.  All unfamiliar terms defined.  Questions were solicited and answered.  I read the entire consent form to her verbatim.  A copy of the consent form was provided for her review outside the office / hospital prior to the procedure.  [employer] publications pertaining to endoscopy, GERD, and ulcer disease were provided.  At the conclusion of the conversation she voiced complete understanding of all that we discussed, satisfaction that all questions were fully answered, and that she felt fully informed and completely capable of making an informed decision.  She requests and desires to proceed with an EGD

## 2019-02-26 NOTE — H&P
"[facility] - General Surgery  General Surgery  H&P      Patient Name: Kirsten Saunders  MRN: [id number]     Chief Complaint: "I am here to be scoped."    HPI:  Ms. Saunders presents today as a consult from Dr. Elkins.  She has been experiencing intermittent nausea for approximately a year.  She is now also experiencing epigastric abdominal pain. She has had occasional episodes of emesis.  Recently she had an episode of red colored emesis possibly representing hematemesis.  No weight loss.  No melena or hematochezia.  She has a history of IBS.  IBS symptoms have improved since therapy with Welchol for hyperlipidemia.  No recent change in bowel habits.  No other associated symptoms.  No specific food intolerance.  No aggravating factors.  No alleviating factors.          Allergies & Meds:  Allergen Reactions    Penicillins Itching       Current Outpatient Medications   Medication Sig Dispense Refill    AFLURIA QUAD [id number], PF, 60 mcg/0.5 mL vaccine ADM 0.5ML IM UTD  0    colesevelam (WELCHOL) 625 mg tablet Take 3 tablets (1,875 mg total) by mouth 2 (two) times daily with meals. 540 tablet 3    ketoconazole (NIZORAL) 2 % cream ALDAIR EXT AA QD  3    mirtazapine (REMERON) 15 MG tablet Take 15 mg by mouth once daily.  0    TRINTELLIX 10 mg Tab TK 1 T PO QD  0         PMFSHx:  Past Medical History:   Diagnosis Date    Allergy     Anxiety     Depression     Hyperlipidemia     Hypertension        Past Surgical History:   Procedure Laterality Date    COLONOSCOPY N/A 2018    Performed by [facility]orro [facility] at [facility] ENDO    surgical history denied         Family History   Problem Relation Age of Onset    Heart disease Sister     Lung cancer Sister     Heart disease Brother     Breast cancer Neg Hx        Social History     Tobacco Use    Smoking status: Former Smoker     Years: 3.00     Types: Cigarettes     Last attempt to quit:      Years since quittin.1    Smokeless tobacco: " Never Used   Substance Use Topics    Alcohol use: Yes     Comment: occasinal    Drug use: No       Review of Systems   Constitutional: Negative for appetite change, chills, fatigue, fever and unexpected weight change.   HENT: Negative for congestion, dental problem, ear pain, mouth sores, postnasal drip, rhinorrhea, sore throat, tinnitus, trouble swallowing and voice change.    Eyes: Negative for photophobia, pain, discharge and visual disturbance.   Respiratory: Negative for cough, chest tightness, shortness of breath and wheezing.   Cardiovascular: Negative for chest pain, palpitations and leg swelling.   Gastrointestinal: Negative for abdominal pain, blood in stool, constipation, diarrhea, nausea and vomiting.   Endocrine: Negative for cold intolerance, heat intolerance, polydipsia, polyphagia and polyuria.   Genitourinary: Negative for difficulty urinating, dysuria, flank pain, frequency, hematuria and urgency.   Musculoskeletal: Negative for arthralgias, joint swelling and myalgias.   Skin: Negative for color change and rash.   Allergic/Immunologic: Negative for immunocompromised state.   Neurological: Negative for dizziness, tremors, seizures, syncope, speech difficulty, weakness, numbness and headaches.   Hematological: Negative for adenopathy. Does not bruise/bleed easily.   Psychiatric/Behavioral: Negative for agitation, confusion, hallucinations, self-injury and suicidal ideas. The patient is not nervous/anxious.           Physical Exam   Constitutional: She is oriented to person, place, and time. She appears well-developed and well-nourished. She is active.  Non-toxic appearance. No distress. Body mass index is 34.55 kg/m².   HENT:   Head: Normocephalic and atraumatic.   Right Ear: Hearing and external ear normal. No drainage or tenderness.   Left Ear: Hearing and external ear normal. No drainage or tenderness.   Nose: Nose normal. No rhinorrhea. No epistaxis.   Mouth/Throat: Uvula is midline,  oropharynx is clear and moist and mucous membranes are normal. Mucous membranes are not pale, not dry and not cyanotic. No oropharyngeal exudate.   Eyes: Conjunctivae and lids are normal. Pupils are equal, round, and reactive to light. Right eye exhibits no discharge and no exudate. Left eye exhibits no discharge and no exudate. Right conjunctiva is not injected. Right eye exhibits no nystagmus. Left eye exhibits no nystagmus.   Neck: Trachea normal, full passive range of motion without pain and phonation normal. No JVD present. Carotid bruit is not present. No tracheal deviation present. No thyroid mass and no thyromegaly present.   Cardiovascular: Normal rate, regular rhythm, S1 normal, S2 normal, normal heart sounds and intact distal pulses. PMI is not displaced. Exam reveals no gallop and no friction rub.   No murmur heard.  Pulmonary/Chest: Effort normal and breath sounds normal. No accessory muscle usage. No respiratory distress. She exhibits no mass, no tenderness and no crepitus. Right breast exhibits no inverted nipple, no mass, no nipple discharge, no skin change and no tenderness. Left breast exhibits no inverted nipple, no mass, no nipple discharge, no skin change and no tenderness. Breasts are symmetrical.   Abdominal: Soft. Normal appearance and bowel sounds are normal. She exhibits no distension, no fluid wave, no abdominal bruit and no mass. There is no hepatosplenomegaly. There is no tenderness. There is no rebound, no guarding and negative Eaton's sign. No hernia. Hernia confirmed negative in the right inguinal area and confirmed negative in the left inguinal area.   Genitourinary: Rectum normal and vagina normal. There is no rash or lesion on the right labia. There is no rash or lesion on the left labia. Right adnexum displays no mass. Left adnexum displays no mass. No vaginal discharge found.   Musculoskeletal:        Cervical back: Normal.        Thoracic back: Normal.        Lumbar back:  Normal.        Right upper arm: Normal.        Left upper arm: Normal.        Right forearm: Normal.        Left forearm: Normal.        Right hand: Normal.        Left hand: Normal.        Right upper leg: Normal.        Left upper leg: Normal.        Right lower leg: Normal.        Left lower leg: Normal.        Right foot: Normal.        Left foot: Normal.   Lymphadenopathy:        Head (right side): No submental, no submandibular and no posterior auricular adenopathy present.        Head (left side): No submental, no submandibular and no posterior auricular adenopathy present.     She has no cervical adenopathy.     She has no axillary adenopathy.        Right: No inguinal and no supraclavicular adenopathy present.        Left: No inguinal and no supraclavicular adenopathy present.   Neurological: She is alert and oriented to person, place, and time. She has normal strength. No cranial nerve deficit or sensory deficit. Coordination and gait normal. GCS eye subscore is 4. GCS verbal subscore is 5. GCS motor subscore is 6.   Skin: Skin is warm, dry and intact. No rash noted. No cyanosis. Nails show no clubbing.   Psychiatric: She has a normal mood and affect. Her speech is normal. Judgment and thought content normal. Her mood appears not anxious. Her affect is not inappropriate. She is not actively hallucinating. She does not exhibit a depressed mood.         Test Results:  Pending    Assessment:       Epigastric abdominal pain with chronic intermittent nausea and occasional emesis.  Possible recent hematemesis.  Epigastric abdominal pain. Differential diagnosis includes is not limited to esophagitis, hiatal hernia, gastritis, gastric ulcer, duodenitis, biliary disease, esophageal neoplasm, gastric neoplasm, etc. Options include but are not limited to endoscopy, 2nd opinion, laparoscopy, radiologic studies, etc.  Multiple comorbid issues ( hyperlipidemia, hypertension ) increase the complexity of required  perioperative evaluation & management, risks of complications, and likely will increase the difficulty and complexity of postoperative care, etc.  These issues must be factored in to preoperative evaluation and perioperative management.    1. Epigastric pain    2. Nausea        Plan:   Epigastric pain  -     Case Request Operating Room: ESOPHAGOGASTRODUODENOSCOPY (EGD)  -     Full code; Standing  -     Place in Outpatient; Standing  -     Vital signs; Standing  -     Insert peripheral IV; Standing  -     Verify beta-blocker dose taken within 24 hours if patient is prescribed beta-blocker; Standing  -     Height and weight; Standing  -     Verify discontinuation of antithrombotics; Standing  -     POCT glucose; Standing  -     Verify blood consent; Standing  -     Verify consent; Standing  -     Diet NPO; Standing  -     Comprehensive metabolic panel; Future; Expected date: 02/26/2019  -     CBC auto differential; Future; Expected date: 02/26/2019  -     Pregnancy, urine rapid; Future; Expected date: 02/26/2019  -     EKG 12-lead; Future  -     Amylase; Future; Expected date: 02/26/2019  -     Lipase; Future; Expected date: 02/26/2019  -     US Abdomen Limited; Future; Expected date: 02/26/2019    Nausea    Other orders  -     lidocaine (PF) 10 mg/ml (1%) injection 10 mg  -     lactated ringers infusion  -     IP VTE LOW RISK PATIENT; Standing        Follow-up around 4/9/2019 for routine post-endosocpy visit.    Counseling/Medical Decision Making:  Kirsten Saunders was counseled on the results of the evaluation thus far and the differential diagnosis as well as the diagnostic and therapeutic options.  Risks, benefits, possible complications, details of, and indications for each option were also discussed.  Diagnoses discussed included but were not limited to: esophagitis, gastritis, ulcer disease, neoplastic disease, GERD, hiatal hernia, angiodysplasias, etc.  Options discussed included but were not limited to:  radiologic studies, empiric medical management, observation, second opinion, PillCam, EGD.  Possible complications of EGD discussed included but were not limited to: bleeding, injury to internal organs, infections, endocarditis, incomplete exam, failure to diagnose a cancer or other serious conditions, need for emergency surgery, need for additional operations or procedures.  Entire conversation held in laymans terms.  All unfamiliar terms defined.  Questions were solicited and answered.  I read the entire consent form to her verbatim.  A copy of the consent form was provided for her review outside the office / hospital prior to the procedure.  [employer] publications pertaining to endoscopy, GERD, and ulcer disease were provided.  At the conclusion of the conversation she voiced complete understanding of all that we discussed, satisfaction that all questions were fully answered, and that she felt fully informed and completely capable of making an informed decision.  She requests and desires to proceed with an EGD

## 2019-02-26 NOTE — PROGRESS NOTES
Subjective:       Patient ID: Kirsten Saunders is a 53 y.o. female.    Chief Complaint: Referral from Dr. Elkins      HPI:  Ms. Saunders presents today as a consult from Dr. Elkins.  She has been experiencing intermittent nausea for approximately a year.  She is now also experiencing epigastric abdominal pain. She has had occasional episodes of emesis.  Recently she had an episode of red colored emesis possibly representing hematemesis.  No weight loss.  No melena or hematochezia.  She has a history of IBS.  IBS symptoms have improved since therapy with Welchol for hyperlipidemia.  No recent change in bowel habits.  No other associated symptoms.  No specific food intolerance.  No aggravating factors.  No alleviating factors.          Allergies & Meds:  Review of patient's allergies indicates:   Allergen Reactions    Penicillins Itching       Current Outpatient Medications   Medication Sig Dispense Refill    AFLURIA QUAD 7129-8508, PF, 60 mcg/0.5 mL vaccine ADM 0.5ML IM UTD  0    colesevelam (WELCHOL) 625 mg tablet Take 3 tablets (1,875 mg total) by mouth 2 (two) times daily with meals. 540 tablet 3    ketoconazole (NIZORAL) 2 % cream ALDAIR EXT AA QD  3    mirtazapine (REMERON) 15 MG tablet Take 15 mg by mouth once daily.  0    TRINTELLIX 10 mg Tab TK 1 T PO QD  0     No current facility-administered medications for this visit.        PMFSHx:  Past Medical History:   Diagnosis Date    Allergy     Anxiety     Depression     Hyperlipidemia     Hypertension        Past Surgical History:   Procedure Laterality Date    COLONOSCOPY N/A 6/29/2018    Performed by Mikie Mascorro MD at [facility]    surgical history denied         Family History   Problem Relation Age of Onset    Heart disease Sister     Lung cancer Sister     Heart disease Brother     Breast cancer Neg Hx        Social History     Tobacco Use    Smoking status: Former Smoker     Years: 3.00     Types: Cigarettes     Last attempt to quit: 1988      Years since quittin.1    Smokeless tobacco: Never Used   Substance Use Topics    Alcohol use: Yes     Comment: occasinal    Drug use: No       Review of Systems   Constitutional: Negative for appetite change, chills, fatigue, fever and unexpected weight change.   HENT: Negative for congestion, dental problem, ear pain, mouth sores, postnasal drip, rhinorrhea, sore throat, tinnitus, trouble swallowing and voice change.    Eyes: Negative for photophobia, pain, discharge and visual disturbance.   Respiratory: Negative for cough, chest tightness, shortness of breath and wheezing.    Cardiovascular: Negative for chest pain, palpitations and leg swelling.   Gastrointestinal: Negative for abdominal pain, blood in stool, constipation, diarrhea, nausea and vomiting.   Endocrine: Negative for cold intolerance, heat intolerance, polydipsia, polyphagia and polyuria.   Genitourinary: Negative for difficulty urinating, dysuria, flank pain, frequency, hematuria and urgency.   Musculoskeletal: Negative for arthralgias, joint swelling and myalgias.   Skin: Negative for color change and rash.   Allergic/Immunologic: Negative for immunocompromised state.   Neurological: Negative for dizziness, tremors, seizures, syncope, speech difficulty, weakness, numbness and headaches.   Hematological: Negative for adenopathy. Does not bruise/bleed easily.   Psychiatric/Behavioral: Negative for agitation, confusion, hallucinations, self-injury and suicidal ideas. The patient is not nervous/anxious.        Objective:      Physical Exam   Constitutional: She is oriented to person, place, and time. She appears well-developed and well-nourished. She is active.  Non-toxic appearance. No distress.   Body mass index is 34.55 kg/m².     HENT:   Head: Normocephalic and atraumatic.   Right Ear: Hearing and external ear normal. No drainage or tenderness.   Left Ear: Hearing and external ear normal. No drainage or tenderness.   Nose: Nose normal. No  rhinorrhea. No epistaxis.   Mouth/Throat: Uvula is midline, oropharynx is clear and moist and mucous membranes are normal. Mucous membranes are not pale, not dry and not cyanotic. No oropharyngeal exudate.   Eyes: Conjunctivae and lids are normal. Pupils are equal, round, and reactive to light. Right eye exhibits no discharge and no exudate. Left eye exhibits no discharge and no exudate. Right conjunctiva is not injected. Right eye exhibits no nystagmus. Left eye exhibits no nystagmus.   Neck: Trachea normal, full passive range of motion without pain and phonation normal. No JVD present. Carotid bruit is not present. No tracheal deviation present. No thyroid mass and no thyromegaly present.   Cardiovascular: Normal rate, regular rhythm, S1 normal, S2 normal, normal heart sounds and intact distal pulses. PMI is not displaced. Exam reveals no gallop and no friction rub.   No murmur heard.  Pulmonary/Chest: Effort normal and breath sounds normal. No accessory muscle usage. No respiratory distress. She exhibits no mass, no tenderness and no crepitus. Right breast exhibits no inverted nipple, no mass, no nipple discharge, no skin change and no tenderness. Left breast exhibits no inverted nipple, no mass, no nipple discharge, no skin change and no tenderness. Breasts are symmetrical.   Abdominal: Soft. Normal appearance and bowel sounds are normal. She exhibits no distension, no fluid wave, no abdominal bruit and no mass. There is no hepatosplenomegaly. There is no tenderness. There is no rebound, no guarding and negative Eaton's sign. No hernia. Hernia confirmed negative in the right inguinal area and confirmed negative in the left inguinal area.   Genitourinary: Rectum normal and vagina normal. There is no rash or lesion on the right labia. There is no rash or lesion on the left labia. Right adnexum displays no mass. Left adnexum displays no mass. No vaginal discharge found.   Musculoskeletal:        Cervical back:  Normal.        Thoracic back: Normal.        Lumbar back: Normal.        Right upper arm: Normal.        Left upper arm: Normal.        Right forearm: Normal.        Left forearm: Normal.        Right hand: Normal.        Left hand: Normal.        Right upper leg: Normal.        Left upper leg: Normal.        Right lower leg: Normal.        Left lower leg: Normal.        Right foot: Normal.        Left foot: Normal.   Lymphadenopathy:        Head (right side): No submental, no submandibular and no posterior auricular adenopathy present.        Head (left side): No submental, no submandibular and no posterior auricular adenopathy present.     She has no cervical adenopathy.     She has no axillary adenopathy.        Right: No inguinal and no supraclavicular adenopathy present.        Left: No inguinal and no supraclavicular adenopathy present.   Neurological: She is alert and oriented to person, place, and time. She has normal strength. No cranial nerve deficit or sensory deficit. Coordination and gait normal. GCS eye subscore is 4. GCS verbal subscore is 5. GCS motor subscore is 6.   Skin: Skin is warm, dry and intact. No rash noted. No cyanosis. Nails show no clubbing.   Psychiatric: She has a normal mood and affect. Her speech is normal. Judgment and thought content normal. Her mood appears not anxious. Her affect is not inappropriate. She is not actively hallucinating. She does not exhibit a depressed mood.         Test Results:  Pending    Assessment:       Epigastric abdominal pain with chronic intermittent nausea and occasional emesis.  Possible recent hematemesis.  Epigastric abdominal pain. Differential diagnosis includes is not limited to esophagitis, hiatal hernia, gastritis, gastric ulcer, duodenitis, biliary disease, esophageal neoplasm, gastric neoplasm, etc. Options include but are not limited to endoscopy, 2nd opinion, laparoscopy, radiologic studies, etc.  Multiple comorbid issues ( hyperlipidemia,  hypertension ) increase the complexity of required perioperative evaluation & management, risks of complications, and likely will increase the difficulty and complexity of postoperative care, etc.  These issues must be factored in to preoperative evaluation and perioperative management.    1. Epigastric pain    2. Nausea        Plan:   Epigastric pain  -     Case Request Operating Room: ESOPHAGOGASTRODUODENOSCOPY (EGD)  -     Full code; Standing  -     Place in Outpatient; Standing  -     Vital signs; Standing  -     Insert peripheral IV; Standing  -     Verify beta-blocker dose taken within 24 hours if patient is prescribed beta-blocker; Standing  -     Height and weight; Standing  -     Verify discontinuation of antithrombotics; Standing  -     POCT glucose; Standing  -     Verify blood consent; Standing  -     Verify consent; Standing  -     Diet NPO; Standing  -     Comprehensive metabolic panel; Future; Expected date: 02/26/2019  -     CBC auto differential; Future; Expected date: 02/26/2019  -     Pregnancy, urine rapid; Future; Expected date: 02/26/2019  -     EKG 12-lead; Future  -     Amylase; Future; Expected date: 02/26/2019  -     Lipase; Future; Expected date: 02/26/2019  -     US Abdomen Limited; Future; Expected date: 02/26/2019    Nausea    Other orders  -     lidocaine (PF) 10 mg/ml (1%) injection 10 mg  -     lactated ringers infusion  -     IP VTE LOW RISK PATIENT; Standing        Follow-up in about 6 weeks (around 4/9/2019) for routine post-endosocpy visit.    Counseling/Medical Decision Making:  Kirsten Saunders was counseled on the results of the evaluation thus far and the differential diagnosis as well as the diagnostic and therapeutic options.  Risks, benefits, possible complications, details of, and indications for each option were also discussed.  Diagnoses discussed included but were not limited to: esophagitis, gastritis, ulcer disease, neoplastic disease, GERD, hiatal hernia,  angiodysplasias, etc.  Options discussed included but were not limited to: radiologic studies, empiric medical management, observation, second opinion, PillCam, EGD.  Possible complications of EGD discussed included but were not limited to: bleeding, injury to internal organs, infections, endocarditis, incomplete exam, failure to diagnose a cancer or other serious conditions, need for emergency surgery, need for additional operations or procedures.  Entire conversation held in laymans terms.  All unfamiliar terms defined.  Questions were solicited and answered.  I read the entire consent form to her verbatim.  A copy of the consent form was provided for her review outside the office / hospital prior to the procedure.  [employer] publications pertaining to endoscopy, GERD, and ulcer disease were provided.  At the conclusion of the conversation she voiced complete understanding of all that we discussed, satisfaction that all questions were fully answered, and that she felt fully informed and completely capable of making an informed decision.  She requests and desires to proceed with an EGD.      Total face-to-face encounter time was 60 minutes, 40 minutes spent counseling as outlined/summarized above.

## 2019-02-26 NOTE — LETTER
February 26, 2019      Birdie Elkins DO  [address] [place] [address]           [facility] - General Surgery  [address] [place] [address]  Phone: [phone]  Fax: [phone]          Patient: Kirsten Saunders   MR Number: [id number]   YOB: 1965   Date of Visit: 2/26/2019       Dear Dr. Birdie Elkins:    Thank you for referring Kirsten Saunders to me for evaluation. Attached you will find relevant portions of my assessment and plan of care.    If you have questions, please do not hesitate to call me. I look forward to following Kirsten Saunders along with you.    Sincerely,    Mikie Mascorro MD    Enclosure  CC:  No Recipients    If you would like to receive this communication electronically, please contact [email or web address] or [phone] to request more information on [employer] access.    For providers and/or their staff who would like to refer a patient to [facility], please contact us through our one-stop-shop provider referral line, [facility], at [phone].    If you feel you have received this communication in error or would no longer like to receive these types of communications, please e-mail [email or web address]

## 2019-03-04 ENCOUNTER — HOSPITAL ENCOUNTER (OUTPATIENT)
Dept: CARDIOLOGY | Facility: HOSPITAL | Age: 54
Discharge: HOME | End: 2019-03-04
Attending: SURGERY
Payer: COMMERCIAL

## 2019-03-04 ENCOUNTER — HOSPITAL ENCOUNTER (OUTPATIENT)
Dept: RADIOLOGY | Facility: HOSPITAL | Age: 54
Discharge: HOME | End: 2019-03-04
Attending: SURGERY
Payer: COMMERCIAL

## 2019-03-04 ENCOUNTER — PATIENT MESSAGE (OUTPATIENT)
Dept: FAMILY MEDICINE | Facility: CLINIC | Age: 54
End: 2019-03-04

## 2019-03-04 DIAGNOSIS — R10.13 EPIGASTRIC PAIN: ICD-10-CM

## 2019-03-04 PROCEDURE — 76705 US ABDOMEN LIMITED: ICD-10-PCS | Mod: 26,,, | Performed by: RADIOLOGY

## 2019-03-04 PROCEDURE — 76705 ECHO EXAM OF ABDOMEN: CPT | Mod: 26,,, | Performed by: RADIOLOGY

## 2019-03-04 PROCEDURE — 76705 ECHO EXAM OF ABDOMEN: CPT | Mod: TC

## 2019-03-04 PROCEDURE — 93005 ELECTROCARDIOGRAM TRACING: CPT

## 2019-03-21 ENCOUNTER — NURSE TRIAGE (OUTPATIENT)
Dept: ADMINISTRATIVE | Facility: CLINIC | Age: 54
End: 2019-03-21

## 2019-03-22 ENCOUNTER — HOSPITAL ENCOUNTER (OUTPATIENT)
Facility: HOSPITAL | Age: 54
Discharge: HOME | End: 2019-03-22
Attending: SURGERY | Admitting: SURGERY
Payer: COMMERCIAL

## 2019-03-22 ENCOUNTER — ANESTHESIA EVENT (OUTPATIENT)
Dept: SURGERY | Facility: HOSPITAL | Age: 54
End: 2019-03-22
Payer: COMMERCIAL

## 2019-03-22 ENCOUNTER — ANESTHESIA (OUTPATIENT)
Dept: SURGERY | Facility: HOSPITAL | Age: 54
End: 2019-03-22
Payer: COMMERCIAL

## 2019-03-22 DIAGNOSIS — R10.13 EPIGASTRIC PAIN: Primary | ICD-10-CM

## 2019-03-22 PROCEDURE — 43239 EGD BIOPSY SINGLE/MULTIPLE: CPT | Mod: ,,, | Performed by: SURGERY

## 2019-03-22 PROCEDURE — D9220A PRA ANESTHESIA: ICD-10-PCS | Mod: ,,, | Performed by: ANESTHESIOLOGY

## 2019-03-22 PROCEDURE — 63600175 PHARM REV CODE 636 W HCPCS: Performed by: NURSE ANESTHETIST, CERTIFIED REGISTERED

## 2019-03-22 PROCEDURE — D9220A PRA ANESTHESIA: Mod: ,,, | Performed by: ANESTHESIOLOGY

## 2019-03-22 PROCEDURE — 88305 TISSUE SPECIMEN TO PATHOLOGY - SURGERY: ICD-10-PCS | Mod: 26,,, | Performed by: PATHOLOGY

## 2019-03-22 PROCEDURE — 43239 PR EGD, FLEX, W/BIOPSY, SGL/MULTI: ICD-10-PCS | Mod: ,,, | Performed by: SURGERY

## 2019-03-22 PROCEDURE — 43239 EGD BIOPSY SINGLE/MULTIPLE: CPT | Performed by: SURGERY

## 2019-03-22 PROCEDURE — 27201012 HC FORCEPS, HOT/COLD, DISP: Performed by: SURGERY

## 2019-03-22 PROCEDURE — 88305 TISSUE EXAM BY PATHOLOGIST: CPT | Performed by: PATHOLOGY

## 2019-03-22 PROCEDURE — 37000009 HC ANESTHESIA EA ADD 15 MINS: Performed by: SURGERY

## 2019-03-22 PROCEDURE — 37000008 HC ANESTHESIA 1ST 15 MINUTES: Performed by: SURGERY

## 2019-03-22 PROCEDURE — 25000003 PHARM REV CODE 250: Performed by: NURSE ANESTHETIST, CERTIFIED REGISTERED

## 2019-03-22 PROCEDURE — 88305 TISSUE EXAM BY PATHOLOGIST: CPT | Mod: 26,,, | Performed by: PATHOLOGY

## 2019-03-22 RX ORDER — KETOROLAC TROMETHAMINE 30 MG/ML
15 INJECTION, SOLUTION INTRAMUSCULAR; INTRAVENOUS ONCE
Status: DISCONTINUED | OUTPATIENT
Start: 2019-03-22 | End: 2019-03-22 | Stop reason: HOSPADM

## 2019-03-22 RX ORDER — SODIUM CHLORIDE, SODIUM LACTATE, POTASSIUM CHLORIDE, CALCIUM CHLORIDE 600; 310; 30; 20 MG/100ML; MG/100ML; MG/100ML; MG/100ML
INJECTION, SOLUTION INTRAVENOUS CONTINUOUS PRN
Status: DISCONTINUED | OUTPATIENT
Start: 2019-03-22 | End: 2019-03-22

## 2019-03-22 RX ORDER — SUCRALFATE 1 G/1
1 TABLET ORAL
Qty: 120 TABLET | Refills: 11 | Status: SHIPPED | OUTPATIENT
Start: 2019-03-22 | End: 2019-05-23

## 2019-03-22 RX ORDER — SODIUM CHLORIDE, SODIUM LACTATE, POTASSIUM CHLORIDE, CALCIUM CHLORIDE 600; 310; 30; 20 MG/100ML; MG/100ML; MG/100ML; MG/100ML
INJECTION, SOLUTION INTRAVENOUS CONTINUOUS
Status: DISCONTINUED | OUTPATIENT
Start: 2019-03-22 | End: 2019-03-22 | Stop reason: HOSPADM

## 2019-03-22 RX ORDER — LIDOCAINE HYDROCHLORIDE 10 MG/ML
1 INJECTION, SOLUTION EPIDURAL; INFILTRATION; INTRACAUDAL; PERINEURAL ONCE
Status: DISCONTINUED | OUTPATIENT
Start: 2019-03-22 | End: 2019-03-22 | Stop reason: HOSPADM

## 2019-03-22 RX ORDER — PANTOPRAZOLE SODIUM 40 MG/1
40 TABLET, DELAYED RELEASE ORAL DAILY
Qty: 30 TABLET | Refills: 11 | Status: SHIPPED | OUTPATIENT
Start: 2019-03-22 | End: 2020-03-21

## 2019-03-22 RX ORDER — MORPHINE SULFATE 4 MG/ML
2 INJECTION, SOLUTION INTRAMUSCULAR; INTRAVENOUS EVERY 5 MIN PRN
Status: DISCONTINUED | OUTPATIENT
Start: 2019-03-22 | End: 2019-03-22 | Stop reason: HOSPADM

## 2019-03-22 RX ORDER — SODIUM CHLORIDE, SODIUM LACTATE, POTASSIUM CHLORIDE, CALCIUM CHLORIDE 600; 310; 30; 20 MG/100ML; MG/100ML; MG/100ML; MG/100ML
125 INJECTION, SOLUTION INTRAVENOUS CONTINUOUS
Status: DISCONTINUED | OUTPATIENT
Start: 2019-03-22 | End: 2019-03-22 | Stop reason: HOSPADM

## 2019-03-22 RX ORDER — PROPOFOL 10 MG/ML
VIAL (ML) INTRAVENOUS
Status: DISCONTINUED | OUTPATIENT
Start: 2019-03-22 | End: 2019-03-22

## 2019-03-22 RX ORDER — OXYCODONE AND ACETAMINOPHEN 5; 325 MG/1; MG/1
1 TABLET ORAL
Status: DISCONTINUED | OUTPATIENT
Start: 2019-03-22 | End: 2019-03-22 | Stop reason: HOSPADM

## 2019-03-22 RX ORDER — ONDANSETRON 2 MG/ML
4 INJECTION INTRAMUSCULAR; INTRAVENOUS DAILY PRN
Status: DISCONTINUED | OUTPATIENT
Start: 2019-03-22 | End: 2019-03-22 | Stop reason: HOSPADM

## 2019-03-22 RX ADMIN — PROPOFOL 100 MG: 10 INJECTION, EMULSION INTRAVENOUS at 01:03

## 2019-03-22 RX ADMIN — SODIUM CHLORIDE, POTASSIUM CHLORIDE, SODIUM LACTATE AND CALCIUM CHLORIDE: 600; 310; 30; 20 INJECTION, SOLUTION INTRAVENOUS at 01:03

## 2019-03-22 RX ADMIN — PROPOFOL 100 MG: 10 INJECTION, EMULSION INTRAVENOUS at 02:03

## 2019-03-22 NOTE — ANESTHESIA PREPROCEDURE EVALUATION
03/22/2019  Kirsten Saunders is a 53 y.o., female.    Pre-op Assessment    I have reviewed the Patient Summary Reports.    I have reviewed the Nursing Notes.   I have reviewed the Medications.     Review of Systems  Anesthesia Hx:  No problems with previous Anesthesia  Neg history of prior surgery. Denies Family Hx of Anesthesia complications.   Denies Personal Hx of Anesthesia complications.   Social:  Non-Smoker    Hematology/Oncology:  Hematology Normal   Oncology Normal     EENT/Dental:EENT/Dental Normal   Cardiovascular:   Hypertension, well controlled    Pulmonary:  Pulmonary Normal    Renal/:  Renal/ Normal     Hepatic/GI:  Hepatic/GI Normal    Musculoskeletal:  Musculoskeletal Normal    Neurological:  Neurology Normal    Endocrine:  Endocrine Normal    Dermatological:  Skin Normal    Psych:   depression          Physical Exam  General:  Well nourished    Airway/Jaw/Neck:  AIRWAY FINDINGS: Normal      Eyes/Ears/Nose:  EYES/EARS/NOSE FINDINGS: Normal   Dental:  DENTAL FINDINGS: Normal   Chest/Lungs:  Chest/Lungs Clear    Heart/Vascular:  Heart Findings: Normal Heart murmur: negative Vascular Findings: Normal    Abdomen:  Abdomen Findings: Normal    Musculoskeletal:  Musculoskeletal Findings: Normal   Skin:  Skin Findings: Normal         Anesthesia Plan  Type of Anesthesia, risks & benefits discussed:  Anesthesia Type:  general  Patient's Preference:   Intra-op Monitoring Plan: standard ASA monitors  Intra-op Monitoring Plan Comments:   Post Op Pain Control Plan:   Post Op Pain Control Plan Comments:   Induction:   IV  Beta Blocker:  Patient is not currently on a Beta-Blocker (No further documentation required).       Informed Consent: Patient understands risks and agrees with Anesthesia plan.  Questions answered. Anesthesia consent signed with patient.  ASA Score: 1     Day of Surgery Review of  History & Physical:    H&P update referred to the provider.

## 2019-03-22 NOTE — TELEPHONE ENCOUNTER
Calling to find out what time her procedure is in the mornign?      Reason for Disposition   Question about upcoming scheduled test, no triage required and triager able to answer question    Protocols used: INFORMATION ONLY CALL-A-AH

## 2019-03-22 NOTE — ANESTHESIA POSTPROCEDURE EVALUATION
"Anesthesia Post Evaluation    Patient: Kirsten Saunders    Procedure(s) Performed: Procedure(s) (LRB):  ESOPHAGOGASTRODUODENOSCOPY (EGD) (N/A)    Final Anesthesia Type: general  Patient location during evaluation: PACU  Patient participation: Yes- Able to Participate  Level of consciousness: awake and awake and alert  Post-procedure vital signs: reviewed and stable  Pain management: adequate  Airway patency: patent  PONV status at discharge: No PONV  Anesthetic complications: no      Cardiovascular status: blood pressure returned to baseline  Respiratory status: unassisted and spontaneous ventilation  Hydration status: euvolemic  Follow-up not needed.        Visit Vitals  BP (!) 165/85   Pulse 61   Temp 36.5 °C (97.7 °F) (Oral)   Resp 14   Ht 5' 6" (1.676 m)   Wt 97.1 kg (214 lb)   LMP  (LMP Unknown)   SpO2 100%   Breastfeeding? No   BMI 34.54 kg/m²       Pain/Sonia Score: Sonia Score: 10 (3/22/2019  3:13 PM)  Modified Sonia Score: 20 (3/22/2019  3:13 PM)        "

## 2019-03-22 NOTE — TRANSFER OF CARE
"Anesthesia Transfer of Care Note    Patient: Kirsten Saunders    Procedure(s) Performed: Procedure(s) (LRB):  ESOPHAGOGASTRODUODENOSCOPY (EGD) (N/A)    Patient location: PACU    Anesthesia Type: MAC    Transport from OR: Transported from OR on room air with adequate spontaneous ventilation    Post pain: adequate analgesia    Post assessment: no apparent anesthetic complications and tolerated procedure well    Post vital signs: stable    Level of consciousness: sedated and responds to stimulation    Nausea/Vomiting: no nausea/vomiting    Complications: none    Transfer of care protocol was followed      Last vitals:   Visit Vitals  BP (!) 142/88 (BP Location: Right arm, Patient Position: Lying)   Pulse 62   Temp 36.6 °C (97.9 °F) (Oral)   Resp 16   Ht 5' 6" (1.676 m)   Wt 97.1 kg (214 lb)   LMP  (LMP Unknown)   SpO2 98%   Breastfeeding? No   BMI 34.54 kg/m²     "

## 2019-03-22 NOTE — INTERVAL H&P NOTE
The patient has been examined and the H&P has been reviewed:    I concur with the findings and no changes have occurred since H&P was written.     Lab results were reviewed from 3/4/2019.  CBC shows no evidence of leukocytosis, anemia, or thrombocytopenia.  Electrolytes are all in the range of normal.  BUN and creatinine shows no evidence of renal dysfunction.  Glucose shows no suspicion diabetes.  Liver profile shows a very mild hyperbilirubinemia with a total bilirubin of 1.2 mg/dL and a minimally elevated ALT, no other evidence of hepatocellular disease or biliary obstruction.  Amylase and lipase showed no suggestion pancreatitis.  Pregnancy test was negative.    EKG reviewed from 3/4/2019.  Tracing showed a normal sinus rhythm with no evidence of any ischemic changes.    Gallbladder ultrasound films and report reviewed from 2/26/2019.  There is evidence of hepatic steatosis and pancreatic steatosis.  No evidence of cholelithiasis, cholecystitis, or dilated bile ducts.    Surgery risks, benefits and alternative options discussed and understood by patient/family.          There are no hospital problems to display for this patient.

## 2019-03-22 NOTE — [employer] PATIENT INSTRUCTIONS
Discharge Summary/Instructions after an Endoscopic Procedure  Patient Name: Kirsten Saunders  Patient MRN: [id number]  Patient YOB: 1965 Friday, March 22, 2019  Mikie Mascorro MD  RESTRICTIONS:  During your procedure today, you received medications for sedation.  These   medications may affect your judgment, balance and coordination.  Therefore,   for 24 hours, you have the following restrictions:   - DO NOT drive a car, operate machinery, make legal/financial decisions,   sign important papers or drink alcohol.    ACTIVITY:  Today: no heavy lifting, straining or running due to procedural   sedation/anesthesia.  The following day: return to full activity including work.  DIET:  Eat and drink normally unless instructed otherwise.     TREATMENT FOR COMMON SIDE EFFECTS:  - Mild abdominal pain, nausea, belching, bloating or excessive gas:  rest,   eat lightly and use a heating pad.  - Sore Throat: treat with throat lozenges and/or gargle with warm salt   water.  - Because air was used during the procedure, expelling large amounts of air   from your rectum or belching is normal.  - If a bowel prep was taken, you may not have a bowel movement for 1-3 days.    This is normal.  SYMPTOMS TO WATCH FOR AND REPORT TO YOUR PHYSICIAN:  1. Abdominal pain or bloating, other than gas cramps.  2. Chest pain.  3. Back pain.  4. Signs of infection such as: chills or fever occurring within 24 hours   after the procedure.  5. Rectal bleeding, which would show as bright red, maroon, or black stools.   (A tablespoon of blood from the rectum is not serious, especially if   hemorrhoids are present.)  6. Vomiting.  7. Weakness or dizziness.  GO DIRECTLY TO THE NEAREST EMERGENCY ROOM IF YOU HAVE ANY OF THE FOLLOWING:      Difficulty breathing              Chills and/or fever over 101 F   Persistent vomiting and/or vomiting blood   Severe abdominal pain   Severe chest pain   Black, tarry stools   Bleeding- more than one  tablespoon   Any other symptom or condition that you feel may need urgent attention  Your doctor recommends these additional instructions:  If any biopsies were taken, your doctors clinic will contact you in 1 to 2   weeks with any results.  - Discharge patient to home.   - Resume previous diet.   - Continue present medications.   - Use Protonix (pantoprazole) 40 mg PO daily.   - Use sucralfate tablets 1 gram PO QID.   - Await pathology results.   - Return to my office in 2 weeks.   - Treat Helicobacter if present  - Further recommendations will depend on clinical course.  For questions, problems or results please call your physician - Mikie Mascorro MD at Work:  [phone].  [facility] EMERGENCY ROOM PHONE NUMBER: [phone]  IF A COMPLICATION OR EMERGENCY SITUATION ARISES AND YOU ARE UNABLE TO REACH   YOUR PHYSICIAN - GO DIRECTLY TO THE EMERGENCY ROOM.  MD Mikie Rivero MD  3/22/2019 2:29:40 PM  This report has been verified and signed electronically.  [employer]

## 2019-03-22 NOTE — DISCHARGE SUMMARY
[facility]  Discharge Note  Short Stay    Admit Date: 3/22/2019    Discharge Date and Time: No discharge date for patient encounter.     Attending Physician: Mikie Mascorro MD     Discharge Provider: Mikie Mascorro    Diagnoses:  Active Hospital Problems    Diagnosis  POA    Epigastric pain [R10.13]  Yes      Resolved Hospital Problems   No resolved problems to display.       Discharged Condition: good    Hospital Course: Patient was admitted for an outpatient procedure and tolerated the procedure well with no complications.    Final Diagnoses: Same as principal problem.    Disposition: Home    Follow up/Patient Instructions:    Medications:  Reconciled Home Medications:      Medication List      START taking these medications    pantoprazole 40 MG tablet  Commonly known as:  PROTONIX  Take 1 tablet (40 mg total) by mouth once daily. Take in the morning before breakfast.  Wait 30 minutes before eating or drinking anything     sucralfate 1 gram tablet  Commonly known as:  CARAFATE  Take 1 tablet (1 g total) by mouth 4 (four) times daily before meals and nightly.        CONTINUE taking these medications    AFLURIA QUAD 3698-6480 (PF) 60 mcg (15 mcg x 4)/0.5 mL Syrg vaccine  Generic drug:  influenza  ADM 0.5ML IM UTD     colesevelam 625 mg tablet  Commonly known as:  WELCHOL  Take 3 tablets (1,875 mg total) by mouth 2 (two) times daily with meals.     ketoconazole 2 % cream  Commonly known as:  NIZORAL  ALDAIR EXT AA QD     mirtazapine 15 MG tablet  Commonly known as:  REMERON  Take 15 mg by mouth once daily.     TRINTELLIX 10 mg Tab  Generic drug:  vortioxetine  TK 1 T PO QD          Discharge Procedure Orders   Diet Adult Regular     No driving until:     Order Specific Question Answer Comments   Date: 3/23/2019      Follow-up Information     Mikie Mascorro MD. Schedule an appointment as soon as possible for a visit in 2 weeks.    Specialty:  General Surgery  Why:  Routine Post Endoscopy Visit  Contact  information:  [address]  [place] [facility] [address] [phone]                   Discharge Procedure Orders (must include Diet, Follow-up, Activity):   Discharge Procedure Orders (must include Diet, Follow-up, Activity)   Diet Adult Regular     No driving until:     Order Specific Question Answer Comments   Date: 3/23/2019

## 2019-04-08 VITALS
BODY MASS INDEX: 34.39 KG/M2 | SYSTOLIC BLOOD PRESSURE: 165 MMHG | DIASTOLIC BLOOD PRESSURE: 85 MMHG | RESPIRATION RATE: 14 BRPM | OXYGEN SATURATION: 100 % | HEIGHT: 66 IN | WEIGHT: 214 LBS | TEMPERATURE: 98 F | HEART RATE: 61 BPM

## 2019-04-09 ENCOUNTER — OFFICE VISIT (OUTPATIENT)
Dept: SURGERY | Facility: CLINIC | Age: 54
End: 2019-04-09
Payer: COMMERCIAL

## 2019-04-09 VITALS
RESPIRATION RATE: 18 BRPM | OXYGEN SATURATION: 98 % | WEIGHT: 214 LBS | HEART RATE: 74 BPM | BODY MASS INDEX: 34.39 KG/M2 | SYSTOLIC BLOOD PRESSURE: 136 MMHG | HEIGHT: 66 IN | DIASTOLIC BLOOD PRESSURE: 83 MMHG | TEMPERATURE: 98 F

## 2019-04-09 DIAGNOSIS — K29.80 DUODENITIS: ICD-10-CM

## 2019-04-09 DIAGNOSIS — K44.9 HIATAL HERNIA: Primary | ICD-10-CM

## 2019-04-09 DIAGNOSIS — K29.70 GASTRITIS WITHOUT BLEEDING, UNSPECIFIED CHRONICITY, UNSPECIFIED GASTRITIS TYPE: ICD-10-CM

## 2019-04-09 DIAGNOSIS — K21.00 GASTROESOPHAGEAL REFLUX DISEASE WITH ESOPHAGITIS: ICD-10-CM

## 2019-04-09 PROCEDURE — 3008F BODY MASS INDEX DOCD: CPT | Mod: S$GLB,,, | Performed by: SURGERY

## 2019-04-09 PROCEDURE — 99213 OFFICE O/P EST LOW 20 MIN: CPT | Mod: S$GLB,,, | Performed by: SURGERY

## 2019-04-09 PROCEDURE — 99213 PR OFFICE/OUTPT VISIT, EST, LEVL III, 20-29 MIN: ICD-10-PCS | Mod: S$GLB,,, | Performed by: SURGERY

## 2019-04-09 PROCEDURE — 3008F PR BODY MASS INDEX (BMI) DOCUMENTED: ICD-10-PCS | Mod: S$GLB,,, | Performed by: SURGERY

## 2019-04-09 RX ORDER — FLUTICASONE PROPIONATE 50 MCG
SPRAY, SUSPENSION (ML) NASAL
COMMUNITY
End: 2019-05-23 | Stop reason: SDUPTHER

## 2019-04-09 NOTE — PROGRESS NOTES
"Subjective:       Patient ID: Kirsten Saunders is a 53 y.o. female.    Chief Complaint: Follow-up (EGD 3-22-19)      HPI:  Ms. Saunders presents today following a recent outpatient EGD with biopsy  She presents today with no complaints.  No nausea, vomiting, fevers, chills, abdominal pain, diarrhea, constipation, melena, hematochezia, hematemesis, etc.  Appetite is excellent.  Weight is stable.  Activity tolerance is normal.  Overall she feels "great".  EGD revealed a hiatal hernia, gastroesophageal reflux disease with esophagitis, gastritis, and duodenitis.  Histopathology of the biopsies confirmed these findings.  Helicobacter negative. Therapy was instituted Protonix and Carafate.  Her pre endoscopy symptoms have all completely resolved.      Allergies & Meds:  Review of patient's allergies indicates:   Allergen Reactions    Penicillins Itching       Current Outpatient Medications   Medication Sig Dispense Refill    AFLURIA QUAD [id number], PF, 60 mcg/0.5 mL vaccine ADM 0.5ML IM UTD  0    colesevelam (WELCHOL) 625 mg tablet Take 3 tablets (1,875 mg total) by mouth 2 (two) times daily with meals. 540 tablet 3    fluticasone (FLONASE) 50 mcg/actuation nasal spray fluticasone propionate 50 mcg/actuation nasal spray,suspension      ketoconazole (NIZORAL) 2 % cream ALDAIR EXT AA QD  3    mirtazapine (REMERON) 15 MG tablet Take 15 mg by mouth once daily.  0    pantoprazole (PROTONIX) 40 MG tablet Take 1 tablet (40 mg total) by mouth once daily. Take in the morning before breakfast.  Wait 30 minutes before eating or drinking anything 30 tablet 11    sucralfate (CARAFATE) 1 gram tablet Take 1 tablet (1 g total) by mouth 4 (four) times daily before meals and nightly. 120 tablet 11    TRINTELLIX 10 mg Tab TK 1 T PO QD  0     No current facility-administered medications for this visit.        PMFSHx:  Past medical history, surgical history, family history, social history reviewed and no changes noted.        Review of " Systems   Constitutional: Negative for appetite change, chills, fatigue, fever and unexpected weight change.   HENT: Negative for congestion, dental problem, ear pain, mouth sores, postnasal drip, rhinorrhea, sore throat, tinnitus, trouble swallowing and voice change.    Eyes: Negative for photophobia, pain, discharge and visual disturbance.   Respiratory: Negative for cough, chest tightness, shortness of breath and wheezing.    Cardiovascular: Negative for chest pain, palpitations and leg swelling.   Gastrointestinal: Negative for abdominal pain, blood in stool, constipation, diarrhea, nausea and vomiting.   Endocrine: Negative for cold intolerance, heat intolerance, polydipsia, polyphagia and polyuria.   Genitourinary: Negative for difficulty urinating, dysuria, flank pain, frequency, hematuria and urgency.   Musculoskeletal: Negative for arthralgias, joint swelling and myalgias.   Skin: Negative for color change and rash.   Allergic/Immunologic: Negative for immunocompromised state.   Neurological: Negative for dizziness, tremors, seizures, syncope, speech difficulty, weakness, numbness and headaches.   Hematological: Negative for adenopathy. Does not bruise/bleed easily.   Psychiatric/Behavioral: Negative for agitation, confusion, hallucinations, self-injury and suicidal ideas. The patient is not nervous/anxious.        Objective:      Physical Exam   Constitutional: She appears well-developed and well-nourished.  Non-toxic appearance. She does not appear ill. No distress.   Cardiovascular: Normal rate, regular rhythm and normal heart sounds. PMI is not displaced. Exam reveals no gallop.   No murmur heard.  Pulmonary/Chest: Effort normal and breath sounds normal. No accessory muscle usage. No tachypnea. No respiratory distress.   Abdominal: Soft. Normal appearance and bowel sounds are normal. There is no tenderness. No hernia.   Skin: Skin is warm, dry and intact. No rash noted.         Test Results:  EGD and  pathology reports were reviewed from 3/22/2019 with the above findings confirmed.    Assessment:       Hiatal hernia.  Gastroesophageal reflux disease.  Gastritis and duodenitis.  Symptoms well controlled with Protonix and Carafate.  1. Hiatal hernia    2. Gastroesophageal reflux disease with esophagitis    3. Gastritis without bleeding, unspecified chronicity, unspecified gastritis type    4. Duodenitis        Plan:   Hiatal hernia    Gastroesophageal reflux disease with esophagitis    Gastritis without bleeding, unspecified chronicity, unspecified gastritis type    Duodenitis     Discontinue Carafate.  Continue Protonix.    Follow up for any concerns or questions as needed, resume care with PCP.

## 2019-05-14 ENCOUNTER — TELEPHONE (OUTPATIENT)
Dept: FAMILY MEDICINE | Facility: CLINIC | Age: 54
End: 2019-05-14

## 2019-05-14 NOTE — TELEPHONE ENCOUNTER
Attempted to contact patient in regards to rescheduling her appt that is scheduled in [facility] with Dr. Elkins, I did leave a detailed message for the patient to call back to reschedule as Dr. Elkins will be resuming clinic starting 5/21 on Tuesday and Thursday in the  Location from [facility]. Provided a call back number for the office.

## 2019-05-14 NOTE — TELEPHONE ENCOUNTER
----- Message from Olga Feliciano sent at 5/14/2019  8:32 AM CDT -----  Contact: Patient  Patient states that she was told she might need to change her appointment for 05/21 and would like for someone to give her a call back.    Please contact to advise [phone]

## 2019-05-15 ENCOUNTER — TELEPHONE (OUTPATIENT)
Dept: FAMILY MEDICINE | Facility: CLINIC | Age: 54
End: 2019-05-15

## 2019-05-15 NOTE — TELEPHONE ENCOUNTER
----- Message from Garrick Richard sent at 5/15/2019  9:05 AM CDT -----  Type:  Patient Returning Call    Who Called:  Patient  Who Left Message for Patient:  Merlyn TAPIA  Does the patient know what this is regarding?:  Appointment  Best Call Back Number:  [phone] (home)

## 2019-05-15 NOTE — TELEPHONE ENCOUNTER
Attempted to return patient call. No answer, I did leave a detailed message for the patient to contact the office to [facility], provided a call back number for the office.

## 2019-05-20 ENCOUNTER — LAB VISIT (OUTPATIENT)
Dept: LAB | Facility: HOSPITAL | Age: 54
End: 2019-05-20
Attending: FAMILY MEDICINE
Payer: COMMERCIAL

## 2019-05-20 DIAGNOSIS — R74.01 ELEVATED ALT MEASUREMENT: ICD-10-CM

## 2019-05-20 DIAGNOSIS — E78.2 MIXED HYPERLIPIDEMIA: ICD-10-CM

## 2019-05-20 LAB
ALBUMIN SERPL BCP-MCNC: 4.1 G/DL (ref 3.5–5.2)
ALP SERPL-CCNC: 135 U/L (ref 55–135)
ALT SERPL W/O P-5'-P-CCNC: 90 U/L (ref 10–44)
ANION GAP SERPL CALC-SCNC: 9 MMOL/L (ref 8–16)
AST SERPL-CCNC: 41 U/L (ref 10–40)
BILIRUB SERPL-MCNC: 0.9 MG/DL (ref 0.1–1)
BUN SERPL-MCNC: 17 MG/DL (ref 6–20)
CALCIUM SERPL-MCNC: 9.1 MG/DL (ref 8.7–10.5)
CHLORIDE SERPL-SCNC: 110 MMOL/L (ref 95–110)
CHOLEST SERPL-MCNC: 230 MG/DL (ref 120–199)
CHOLEST/HDLC SERPL: 4.6 {RATIO} (ref 2–5)
CO2 SERPL-SCNC: 20 MMOL/L (ref 23–29)
CREAT SERPL-MCNC: 0.7 MG/DL (ref 0.5–1.4)
EST. GFR  (AFRICAN AMERICAN): >60 ML/MIN/1.73 M^2
EST. GFR  (NON AFRICAN AMERICAN): >60 ML/MIN/1.73 M^2
GLUCOSE SERPL-MCNC: 98 MG/DL (ref 70–110)
HDLC SERPL-MCNC: 50 MG/DL (ref 40–75)
HDLC SERPL: 21.7 % (ref 20–50)
LDLC SERPL CALC-MCNC: 137.6 MG/DL (ref 63–159)
NONHDLC SERPL-MCNC: 180 MG/DL
POTASSIUM SERPL-SCNC: 3.7 MMOL/L (ref 3.5–5.1)
PROT SERPL-MCNC: 7.2 G/DL (ref 6–8.4)
SODIUM SERPL-SCNC: 139 MMOL/L (ref 136–145)
TRIGL SERPL-MCNC: 212 MG/DL (ref 30–150)

## 2019-05-20 PROCEDURE — 80061 LIPID PANEL: CPT

## 2019-05-20 PROCEDURE — 36415 COLL VENOUS BLD VENIPUNCTURE: CPT

## 2019-05-20 PROCEDURE — 80053 COMPREHEN METABOLIC PANEL: CPT

## 2019-05-23 ENCOUNTER — LAB VISIT (OUTPATIENT)
Dept: LAB | Facility: HOSPITAL | Age: 54
End: 2019-05-23
Attending: FAMILY MEDICINE
Payer: COMMERCIAL

## 2019-05-23 ENCOUNTER — OFFICE VISIT (OUTPATIENT)
Dept: FAMILY MEDICINE | Facility: CLINIC | Age: 54
End: 2019-05-23
Payer: COMMERCIAL

## 2019-05-23 VITALS
RESPIRATION RATE: 20 BRPM | WEIGHT: 208.63 LBS | HEIGHT: 66 IN | SYSTOLIC BLOOD PRESSURE: 122 MMHG | OXYGEN SATURATION: 98 % | DIASTOLIC BLOOD PRESSURE: 75 MMHG | BODY MASS INDEX: 33.53 KG/M2 | HEART RATE: 63 BPM

## 2019-05-23 DIAGNOSIS — Z88.9 H/O SEASONAL ALLERGIES: ICD-10-CM

## 2019-05-23 DIAGNOSIS — R74.8 ELEVATED LIVER ENZYMES: ICD-10-CM

## 2019-05-23 DIAGNOSIS — R74.8 ELEVATED LIVER ENZYMES: Primary | ICD-10-CM

## 2019-05-23 DIAGNOSIS — E78.00 PURE HYPERCHOLESTEROLEMIA: ICD-10-CM

## 2019-05-23 PROCEDURE — 99999 PR PBB SHADOW E&M-EST. PATIENT-LVL III: CPT | Mod: PBBFAC,,, | Performed by: FAMILY MEDICINE

## 2019-05-23 PROCEDURE — 80074 ACUTE HEPATITIS PANEL: CPT

## 2019-05-23 PROCEDURE — 99999 PR PBB SHADOW E&M-EST. PATIENT-LVL III: ICD-10-PCS | Mod: PBBFAC,,, | Performed by: FAMILY MEDICINE

## 2019-05-23 PROCEDURE — 36415 COLL VENOUS BLD VENIPUNCTURE: CPT

## 2019-05-23 PROCEDURE — 99214 OFFICE O/P EST MOD 30 MIN: CPT | Mod: S$GLB,,, | Performed by: FAMILY MEDICINE

## 2019-05-23 PROCEDURE — 99214 PR OFFICE/OUTPT VISIT, EST, LEVL IV, 30-39 MIN: ICD-10-PCS | Mod: S$GLB,,, | Performed by: FAMILY MEDICINE

## 2019-05-23 RX ORDER — COLESEVELAM 180 1/1
1875 TABLET ORAL 2 TIMES DAILY WITH MEALS
Qty: 540 TABLET | Refills: 3 | Status: SHIPPED | OUTPATIENT
Start: 2019-05-23 | End: 2020-05-22

## 2019-05-23 RX ORDER — CETIRIZINE HYDROCHLORIDE, PSEUDOEPHEDRINE HYDROCHLORIDE 5; 120 MG/1; MG/1
1 TABLET, FILM COATED, EXTENDED RELEASE ORAL 2 TIMES DAILY PRN
Qty: 60 TABLET | Refills: 1 | Status: SHIPPED | OUTPATIENT
Start: 2019-05-23 | End: 2019-06-02

## 2019-05-23 RX ORDER — FLUTICASONE PROPIONATE 50 MCG
1 SPRAY, SUSPENSION (ML) NASAL DAILY
Qty: 16 G | Refills: 5 | Status: SHIPPED | OUTPATIENT
Start: 2019-05-23

## 2019-05-23 NOTE — PROGRESS NOTES
Subjective:       Patient ID: Kirsten Saunders is a 53 y.o. female.    Chief Complaint: Follow-up (Allergies-would like Zyrtec-D & Flonase) and Medication Refill    HPI   Ms. Saunders presents for follow-up and med refills. Has occasional seasonal allergies and requests refills of zyrtec D and flonase for this.    Has had slightly elevated liver enzymes for approximately 10 years. Recent ultrasound of her liver showed mild fatty liver disease.     Her cholesterol panel has improved with welchol.    Review of Systems   Constitutional: Negative for chills, fatigue, fever and unexpected weight change.   HENT: Positive for congestion, rhinorrhea and sneezing. Negative for facial swelling.    Gastrointestinal: Negative for abdominal pain, blood in stool, constipation and diarrhea.       Past Medical History:   Diagnosis Date    Allergy     Anxiety     Depression     Hyperlipidemia     Hypertension      Past Surgical History:   Procedure Laterality Date    COLONOSCOPY N/A 2018    [facility]    ESOPHAGOGASTRODUODENOSCOPY (EGD) N/A 3/22/2019    Per[facility]    surgical history denied       Social History     Socioeconomic History    Marital status:      Spouse name: Not on file    Number of children: Not on file    Years of education: Not on file    Highest education level: Not on file   Occupational History    Not on file   Social Needs    Financial resource strain: Not on file    Food insecurity:     Worry: Not on file     Inability: Not on file    Transportation needs:     Medical: Not on file     Non-medical: Not on file   Tobacco Use    Smoking status: Former Smoker     Years: 3.00     Types: Cigarettes     Last attempt to quit:      Years since quittin.4    Smokeless tobacco: Never Used   Substance and Sexual Activity    Alcohol use: Yes     Comment: occasinal    Drug use: No    Sexual activity: Yes     Partners:  "Male     Birth control/protection: Other-see comments     Comment: postmen.    Lifestyle    Physical activity:     Days per week: Not on file     Minutes per session: Not on file    Stress: Not on file   Relationships    Social connections:     Talks on phone: Not on file     Gets together: Not on file     Attends Oriental orthodox service: Not on file     Active member of club or organization: Not on file     Attends meetings of clubs or organizations: Not on file     Relationship status: Not on file   Other Topics Concern    Not on file   Social History Narrative    Not on file     Family History   Problem Relation Age of Onset    Heart disease Sister     Lung cancer Sister     Heart disease Brother     Breast cancer Neg Hx        Objective:      /75 (BP Location: Left arm, Patient Position: Sitting, BP Method: Medium (Automatic))   Pulse 63   Resp 20   Ht 5' 6" (1.676 m)   Wt 94.6 kg (208 lb 9.6 oz)   LMP  (LMP Unknown)   SpO2 98%   BMI 33.67 kg/m²   Physical Exam   Constitutional: She is oriented to person, place, and time. She appears well-developed and well-nourished. No distress.   obese   Cardiovascular: Normal rate, regular rhythm and normal heart sounds.   No murmur heard.  Pulmonary/Chest: Effort normal and breath sounds normal. No stridor. No respiratory distress.   Neurological: She is alert and oriented to person, place, and time.   Skin: She is not diaphoretic.   Psychiatric: She has a normal mood and affect. Her behavior is normal. Judgment and thought content normal.   Vitals reviewed.      Assessment:       1. Elevated liver enzymes    2. Pure hypercholesterolemia    3. H/O seasonal allergies        Plan:       Elevated liver enzymes  -     Mild, likely due to NAFLD; check hepatitis panel  -     Hepatitis panel, acute; Future; Expected date: 05/23/2019    Pure hypercholesterolemia  -     colesevelam (WELCHOL) 625 mg tablet; Take 3 tablets (1,875 mg total) by mouth 2 (two) times daily " with meals.  Dispense: 540 tablet; Refill: 3    H/O seasonal allergies  -     fluticasone propionate (FLONASE) 50 mcg/actuation nasal spray; 1 spray (50 mcg total) by Each Nare route once daily.  Dispense: 16 g; Refill: 5  -     cetirizine-pseudoephedrine 5-120 mg Tb12; Take 1 tablet by mouth 2 (two) times daily as needed.  Dispense: 60 tablet; Refill: 1            Risks, benefits, and side effects were discussed with the patient. All questions were answered to the fullest satisfaction of the patient, and pt verbalized understanding and agreement to treatment plan. Pt was to call with any new or worsening symptoms, or present to the ER.

## 2019-05-24 LAB
HAV IGM SERPL QL IA: NEGATIVE
HBV CORE IGM SERPL QL IA: NEGATIVE
HBV SURFACE AG SERPL QL IA: NEGATIVE
HCV AB SERPL QL IA: NEGATIVE

## 2020-05-05 ENCOUNTER — PATIENT MESSAGE (OUTPATIENT)
Dept: ADMINISTRATIVE | Facility: HOSPITAL | Age: 55
End: 2020-05-05

## 2020-05-20 ENCOUNTER — PATIENT MESSAGE (OUTPATIENT)
Dept: ADMINISTRATIVE | Facility: HOSPITAL | Age: 55
End: 2020-05-20

## 2020-06-18 DIAGNOSIS — I10 HYPERTENSION: ICD-10-CM

## 2020-07-23 DIAGNOSIS — Z12.39 BREAST CANCER SCREENING: ICD-10-CM

## 2020-10-05 ENCOUNTER — PATIENT MESSAGE (OUTPATIENT)
Dept: ADMINISTRATIVE | Facility: HOSPITAL | Age: 55
End: 2020-10-05

## 2021-01-04 ENCOUNTER — PATIENT MESSAGE (OUTPATIENT)
Dept: ADMINISTRATIVE | Facility: HOSPITAL | Age: 56
End: 2021-01-04

## 2021-03-16 ENCOUNTER — PATIENT MESSAGE (OUTPATIENT)
Dept: FAMILY MEDICINE | Facility: CLINIC | Age: 56
End: 2021-03-16

## 2021-04-07 ENCOUNTER — PATIENT MESSAGE (OUTPATIENT)
Dept: ADMINISTRATIVE | Facility: HOSPITAL | Age: 56
End: 2021-04-07

## 2022-01-11 ENCOUNTER — PATIENT MESSAGE (OUTPATIENT)
Dept: ADMINISTRATIVE | Facility: HOSPITAL | Age: 57
End: 2022-01-11
Payer: COMMERCIAL

## 2022-11-13 ENCOUNTER — PATIENT MESSAGE (OUTPATIENT)
Dept: FAMILY MEDICINE | Facility: CLINIC | Age: 57
End: 2022-11-13
Payer: COMMERCIAL

## (undated) DEVICE — KIT ENDO CARRY ON PROCEDURE

## (undated) DEVICE — GLOVE PROTEXIS PI SYN SURG 6.5

## (undated) DEVICE — SOL IRRI STRL WATER 1000ML

## (undated) DEVICE — CANISTER SUCTION 3000CC

## (undated) DEVICE — SYR SLIP TIP 5CC